# Patient Record
Sex: MALE | Race: WHITE | NOT HISPANIC OR LATINO | Employment: OTHER | URBAN - METROPOLITAN AREA
[De-identification: names, ages, dates, MRNs, and addresses within clinical notes are randomized per-mention and may not be internally consistent; named-entity substitution may affect disease eponyms.]

---

## 2021-01-01 ENCOUNTER — APPOINTMENT (OUTPATIENT)
Dept: GENERAL RADIOLOGY | Facility: CLINIC | Age: 76
DRG: 871 | End: 2021-01-01
Attending: INTERNAL MEDICINE
Payer: COMMERCIAL

## 2021-01-01 ENCOUNTER — HOSPITAL ENCOUNTER (EMERGENCY)
Facility: CLINIC | Age: 76
Discharge: HOME | End: 2021-04-07
Attending: EMERGENCY MEDICINE | Admitting: EMERGENCY MEDICINE
Payer: COMMERCIAL

## 2021-01-01 ENCOUNTER — HOSPITAL ENCOUNTER (EMERGENCY)
Facility: CLINIC | Age: 76
Discharge: HOME | End: 2021-05-01
Attending: PHYSICIAN ASSISTANT | Admitting: PHYSICIAN ASSISTANT
Payer: COMMERCIAL

## 2021-01-01 ENCOUNTER — APPOINTMENT (OUTPATIENT)
Dept: ULTRASOUND IMAGING | Facility: CLINIC | Age: 76
DRG: 871 | End: 2021-01-01
Attending: INTERNAL MEDICINE
Payer: COMMERCIAL

## 2021-01-01 ENCOUNTER — APPOINTMENT (OUTPATIENT)
Dept: CT IMAGING | Facility: CLINIC | Age: 76
End: 2021-01-01
Attending: PHYSICIAN ASSISTANT
Payer: COMMERCIAL

## 2021-01-01 ENCOUNTER — HOSPITAL ENCOUNTER (OUTPATIENT)
Dept: MRI IMAGING | Facility: CLINIC | Age: 76
End: 2021-05-24
Attending: INTERNAL MEDICINE
Payer: COMMERCIAL

## 2021-01-01 ENCOUNTER — HOSPITAL ENCOUNTER (INPATIENT)
Facility: CLINIC | Age: 76
LOS: 6 days | Discharge: HOSPICE | DRG: 871 | End: 2021-06-05
Attending: EMERGENCY MEDICINE | Admitting: INTERNAL MEDICINE
Payer: COMMERCIAL

## 2021-01-01 ENCOUNTER — HOSPITAL ENCOUNTER (OUTPATIENT)
Dept: CT IMAGING | Facility: CLINIC | Age: 76
End: 2021-05-24
Attending: INTERNAL MEDICINE
Payer: COMMERCIAL

## 2021-01-01 ENCOUNTER — APPOINTMENT (OUTPATIENT)
Dept: GENERAL RADIOLOGY | Facility: CLINIC | Age: 76
DRG: 844 | End: 2021-01-01
Attending: EMERGENCY MEDICINE
Payer: COMMERCIAL

## 2021-01-01 ENCOUNTER — APPOINTMENT (OUTPATIENT)
Dept: CT IMAGING | Facility: CLINIC | Age: 76
DRG: 871 | End: 2021-01-01
Attending: EMERGENCY MEDICINE
Payer: COMMERCIAL

## 2021-01-01 ENCOUNTER — HOSPITAL ENCOUNTER (INPATIENT)
Facility: CLINIC | Age: 76
LOS: 1 days | DRG: 844 | End: 2021-06-10
Attending: EMERGENCY MEDICINE | Admitting: INTERNAL MEDICINE
Payer: COMMERCIAL

## 2021-01-01 ENCOUNTER — DOCUMENTATION ONLY (OUTPATIENT)
Dept: OTHER | Facility: CLINIC | Age: 76
End: 2021-01-01

## 2021-01-01 ENCOUNTER — HOSPITAL ENCOUNTER (OUTPATIENT)
Dept: ULTRASOUND IMAGING | Facility: CLINIC | Age: 76
Discharge: HOME | End: 2021-05-13
Attending: INTERNAL MEDICINE | Admitting: INTERNAL MEDICINE
Payer: COMMERCIAL

## 2021-01-01 VITALS
DIASTOLIC BLOOD PRESSURE: 90 MMHG | HEART RATE: 105 BPM | SYSTOLIC BLOOD PRESSURE: 148 MMHG | RESPIRATION RATE: 16 BRPM | OXYGEN SATURATION: 98 %

## 2021-01-01 VITALS
RESPIRATION RATE: 32 BRPM | WEIGHT: 137.2 LBS | BODY MASS INDEX: 20.32 KG/M2 | HEIGHT: 69 IN | HEART RATE: 93 BPM | SYSTOLIC BLOOD PRESSURE: 141 MMHG | OXYGEN SATURATION: 95 % | TEMPERATURE: 98.3 F | DIASTOLIC BLOOD PRESSURE: 97 MMHG

## 2021-01-01 VITALS
RESPIRATION RATE: 18 BRPM | SYSTOLIC BLOOD PRESSURE: 131 MMHG | TEMPERATURE: 98.1 F | HEART RATE: 82 BPM | OXYGEN SATURATION: 98 % | DIASTOLIC BLOOD PRESSURE: 81 MMHG

## 2021-01-01 VITALS
OXYGEN SATURATION: 96 % | RESPIRATION RATE: 18 BRPM | DIASTOLIC BLOOD PRESSURE: 79 MMHG | HEART RATE: 89 BPM | TEMPERATURE: 98 F | SYSTOLIC BLOOD PRESSURE: 125 MMHG

## 2021-01-01 VITALS
RESPIRATION RATE: 28 BRPM | SYSTOLIC BLOOD PRESSURE: 133 MMHG | DIASTOLIC BLOOD PRESSURE: 64 MMHG | OXYGEN SATURATION: 86 % | HEART RATE: 67 BPM | TEMPERATURE: 98.4 F

## 2021-01-01 DIAGNOSIS — C7A.8 OTHER MALIGNANT NEUROENDOCRINE TUMORS (H): ICD-10-CM

## 2021-01-01 DIAGNOSIS — R41.82 ALTERED MENTAL STATUS, UNSPECIFIED ALTERED MENTAL STATUS TYPE: ICD-10-CM

## 2021-01-01 DIAGNOSIS — K76.9 LIVER LESION: ICD-10-CM

## 2021-01-01 DIAGNOSIS — Z51.5 HOSPICE CARE PATIENT: ICD-10-CM

## 2021-01-01 DIAGNOSIS — G89.3 NEOPLASM RELATED PAIN: ICD-10-CM

## 2021-01-01 DIAGNOSIS — K86.9 DISEASE OF PANCREAS: ICD-10-CM

## 2021-01-01 DIAGNOSIS — C7A.8 NEUROENDOCRINE CANCER (H): Primary | ICD-10-CM

## 2021-01-01 DIAGNOSIS — M54.2 CHRONIC NECK PAIN: ICD-10-CM

## 2021-01-01 DIAGNOSIS — Z11.59 ENCOUNTER FOR SCREENING FOR OTHER VIRAL DISEASES: ICD-10-CM

## 2021-01-01 DIAGNOSIS — J96.01 ACUTE RESPIRATORY FAILURE WITH HYPOXIA (H): ICD-10-CM

## 2021-01-01 DIAGNOSIS — G89.29 CHRONIC NECK PAIN: ICD-10-CM

## 2021-01-01 DIAGNOSIS — K86.89 PANCREATIC MASS: ICD-10-CM

## 2021-01-01 DIAGNOSIS — R93.2 ABNORMAL CT OF LIVER: ICD-10-CM

## 2021-01-01 DIAGNOSIS — R16.0 LIVER MASS: ICD-10-CM

## 2021-01-01 DIAGNOSIS — J18.9 PNEUMONIA DUE TO INFECTIOUS ORGANISM, UNSPECIFIED LATERALITY, UNSPECIFIED PART OF LUNG: ICD-10-CM

## 2021-01-01 LAB
ALBUMIN SERPL-MCNC: 2 G/DL (ref 3.4–5)
ALBUMIN SERPL-MCNC: 2.3 G/DL (ref 3.4–5)
ALBUMIN SERPL-MCNC: 2.4 G/DL (ref 3.4–5)
ALBUMIN SERPL-MCNC: 2.5 G/DL (ref 3.4–5)
ALBUMIN SERPL-MCNC: 3 G/DL (ref 3.4–5)
ALBUMIN UR-MCNC: NEGATIVE MG/DL
ALP SERPL-CCNC: 179 U/L (ref 40–150)
ALP SERPL-CCNC: 357 U/L (ref 40–150)
ALP SERPL-CCNC: 391 U/L (ref 40–150)
ALP SERPL-CCNC: 419 U/L (ref 40–150)
ALP SERPL-CCNC: 423 U/L (ref 40–150)
ALT SERPL W P-5'-P-CCNC: 110 U/L (ref 0–70)
ALT SERPL W P-5'-P-CCNC: 114 U/L (ref 0–70)
ALT SERPL W P-5'-P-CCNC: 58 U/L (ref 0–70)
ALT SERPL W P-5'-P-CCNC: 70 U/L (ref 0–70)
ALT SERPL W P-5'-P-CCNC: 80 U/L (ref 0–70)
ANION GAP SERPL CALCULATED.3IONS-SCNC: 1 MMOL/L (ref 3–14)
ANION GAP SERPL CALCULATED.3IONS-SCNC: 2 MMOL/L (ref 3–14)
ANION GAP SERPL CALCULATED.3IONS-SCNC: 2 MMOL/L (ref 3–14)
ANION GAP SERPL CALCULATED.3IONS-SCNC: 3 MMOL/L (ref 3–14)
ANION GAP SERPL CALCULATED.3IONS-SCNC: 4 MMOL/L (ref 3–14)
ANION GAP SERPL CALCULATED.3IONS-SCNC: 5 MMOL/L (ref 3–14)
ANION GAP SERPL CALCULATED.3IONS-SCNC: 6 MMOL/L (ref 3–14)
ANION GAP SERPL CALCULATED.3IONS-SCNC: ABNORMAL MMOL/L (ref 3–14)
APPEARANCE UR: CLEAR
AST SERPL W P-5'-P-CCNC: 114 U/L (ref 0–45)
AST SERPL W P-5'-P-CCNC: 129 U/L (ref 0–45)
AST SERPL W P-5'-P-CCNC: 385 U/L (ref 0–45)
AST SERPL W P-5'-P-CCNC: 499 U/L (ref 0–45)
AST SERPL W P-5'-P-CCNC: 55 U/L (ref 0–45)
BACTERIA SPEC CULT: NO GROWTH
BACTERIA SPEC CULT: NO GROWTH
BASOPHILS # BLD AUTO: 0 10E9/L (ref 0–0.2)
BASOPHILS # BLD AUTO: 0 10E9/L (ref 0–0.2)
BASOPHILS # BLD AUTO: 0.1 10E9/L (ref 0–0.2)
BASOPHILS # BLD AUTO: 0.1 10E9/L (ref 0–0.2)
BASOPHILS NFR BLD AUTO: 0 %
BASOPHILS NFR BLD AUTO: 0.2 %
BASOPHILS NFR BLD AUTO: 0.5 %
BASOPHILS NFR BLD AUTO: 0.6 %
BILIRUB SERPL-MCNC: 0.4 MG/DL (ref 0.2–1.3)
BILIRUB SERPL-MCNC: 0.6 MG/DL (ref 0.2–1.3)
BILIRUB SERPL-MCNC: 0.7 MG/DL (ref 0.2–1.3)
BILIRUB SERPL-MCNC: 0.9 MG/DL (ref 0.2–1.3)
BILIRUB SERPL-MCNC: 1.3 MG/DL (ref 0.2–1.3)
BILIRUB UR QL STRIP: NEGATIVE
BUN SERPL-MCNC: 11 MG/DL (ref 7–30)
BUN SERPL-MCNC: 11 MG/DL (ref 7–30)
BUN SERPL-MCNC: 13 MG/DL (ref 7–30)
BUN SERPL-MCNC: 15 MG/DL (ref 7–30)
BUN SERPL-MCNC: 16 MG/DL (ref 7–30)
BUN SERPL-MCNC: 6 MG/DL (ref 7–30)
BUN SERPL-MCNC: 9 MG/DL (ref 7–30)
BUN SERPL-MCNC: 9 MG/DL (ref 7–30)
CALCIUM SERPL-MCNC: 8.2 MG/DL (ref 8.5–10.1)
CALCIUM SERPL-MCNC: 8.4 MG/DL (ref 8.5–10.1)
CALCIUM SERPL-MCNC: 8.5 MG/DL (ref 8.5–10.1)
CALCIUM SERPL-MCNC: 8.6 MG/DL (ref 8.5–10.1)
CALCIUM SERPL-MCNC: 8.6 MG/DL (ref 8.5–10.1)
CHLORIDE SERPL-SCNC: 106 MMOL/L (ref 94–109)
CHLORIDE SERPL-SCNC: 89 MMOL/L (ref 94–109)
CHLORIDE SERPL-SCNC: 91 MMOL/L (ref 94–109)
CHLORIDE SERPL-SCNC: 92 MMOL/L (ref 94–109)
CHLORIDE SERPL-SCNC: 92 MMOL/L (ref 94–109)
CHLORIDE SERPL-SCNC: 95 MMOL/L (ref 94–109)
CHLORIDE SERPL-SCNC: 98 MMOL/L (ref 94–109)
CHLORIDE SERPL-SCNC: 99 MMOL/L (ref 94–109)
CO2 BLDCOV-SCNC: 57 MMOL/L (ref 21–28)
CO2 SERPL-SCNC: 31 MMOL/L (ref 20–32)
CO2 SERPL-SCNC: 31 MMOL/L (ref 20–32)
CO2 SERPL-SCNC: 32 MMOL/L (ref 20–32)
CO2 SERPL-SCNC: 32 MMOL/L (ref 20–32)
CO2 SERPL-SCNC: 33 MMOL/L (ref 20–32)
CO2 SERPL-SCNC: 34 MMOL/L (ref 20–32)
CO2 SERPL-SCNC: 35 MMOL/L (ref 20–32)
CO2 SERPL-SCNC: >45 MMOL/L (ref 20–32)
COLOR UR AUTO: YELLOW
COPATH REPORT: NORMAL
COPATH REPORT: NORMAL
CREAT SERPL-MCNC: 0.39 MG/DL (ref 0.66–1.25)
CREAT SERPL-MCNC: 0.44 MG/DL (ref 0.66–1.25)
CREAT SERPL-MCNC: 0.44 MG/DL (ref 0.66–1.25)
CREAT SERPL-MCNC: 0.45 MG/DL (ref 0.66–1.25)
CREAT SERPL-MCNC: 0.47 MG/DL (ref 0.66–1.25)
CREAT SERPL-MCNC: 0.49 MG/DL (ref 0.66–1.25)
CREAT SERPL-MCNC: 0.53 MG/DL (ref 0.66–1.25)
CREAT SERPL-MCNC: 0.7 MG/DL (ref 0.66–1.25)
CREAT SERPL-MCNC: 0.8 MG/DL (ref 0.66–1.25)
DIFFERENTIAL METHOD BLD: ABNORMAL
EOSINOPHIL # BLD AUTO: 0 10E9/L (ref 0–0.7)
EOSINOPHIL # BLD AUTO: 0.4 10E9/L (ref 0–0.7)
EOSINOPHIL NFR BLD AUTO: 0 %
EOSINOPHIL NFR BLD AUTO: 0.1 %
EOSINOPHIL NFR BLD AUTO: 0.2 %
EOSINOPHIL NFR BLD AUTO: 3.3 %
ERYTHROCYTE [DISTWIDTH] IN BLOOD BY AUTOMATED COUNT: 14.5 % (ref 10–15)
ERYTHROCYTE [DISTWIDTH] IN BLOOD BY AUTOMATED COUNT: 14.6 % (ref 10–15)
ERYTHROCYTE [DISTWIDTH] IN BLOOD BY AUTOMATED COUNT: 14.6 % (ref 10–15)
ERYTHROCYTE [DISTWIDTH] IN BLOOD BY AUTOMATED COUNT: 14.7 % (ref 10–15)
ERYTHROCYTE [DISTWIDTH] IN BLOOD BY AUTOMATED COUNT: 14.8 % (ref 10–15)
ERYTHROCYTE [DISTWIDTH] IN BLOOD BY AUTOMATED COUNT: 15 % (ref 10–15)
GFR SERPL CREATININE-BSD FRML MDRD: 86 ML/MIN/{1.73_M2}
GFR SERPL CREATININE-BSD FRML MDRD: >90 ML/MIN/{1.73_M2}
GLUCOSE SERPL-MCNC: 100 MG/DL (ref 70–99)
GLUCOSE SERPL-MCNC: 100 MG/DL (ref 70–99)
GLUCOSE SERPL-MCNC: 107 MG/DL (ref 70–99)
GLUCOSE SERPL-MCNC: 115 MG/DL (ref 70–99)
GLUCOSE SERPL-MCNC: 121 MG/DL (ref 70–99)
GLUCOSE SERPL-MCNC: 123 MG/DL (ref 70–99)
GLUCOSE SERPL-MCNC: 133 MG/DL (ref 70–99)
GLUCOSE SERPL-MCNC: 142 MG/DL (ref 70–99)
GLUCOSE UR STRIP-MCNC: NEGATIVE MG/DL
HCT VFR BLD AUTO: 35.8 % (ref 40–53)
HCT VFR BLD AUTO: 36.4 % (ref 40–53)
HCT VFR BLD AUTO: 36.4 % (ref 40–53)
HCT VFR BLD AUTO: 37.9 % (ref 40–53)
HCT VFR BLD AUTO: 38.2 % (ref 40–53)
HCT VFR BLD AUTO: 40.9 % (ref 40–53)
HCT VFR BLD AUTO: 41.3 % (ref 40–53)
HCT VFR BLD AUTO: 43.5 % (ref 40–53)
HGB BLD-MCNC: 10.8 G/DL (ref 13.3–17.7)
HGB BLD-MCNC: 11.3 G/DL (ref 13.3–17.7)
HGB BLD-MCNC: 11.5 G/DL (ref 13.3–17.7)
HGB BLD-MCNC: 11.8 G/DL (ref 13.3–17.7)
HGB BLD-MCNC: 12 G/DL (ref 13.3–17.7)
HGB BLD-MCNC: 12.3 G/DL (ref 13.3–17.7)
HGB BLD-MCNC: 12.8 G/DL (ref 13.3–17.7)
HGB BLD-MCNC: 13.6 G/DL (ref 13.3–17.7)
HGB UR QL STRIP: NEGATIVE
IMM GRANULOCYTES # BLD: 0 10E9/L (ref 0–0.4)
IMM GRANULOCYTES # BLD: 0.1 10E9/L (ref 0–0.4)
IMM GRANULOCYTES # BLD: 0.2 10E9/L (ref 0–0.4)
IMM GRANULOCYTES NFR BLD: 0.3 %
IMM GRANULOCYTES NFR BLD: 0.5 %
IMM GRANULOCYTES NFR BLD: 1.2 %
INR PPP: 1.13 (ref 0.86–1.14)
INR PPP: 1.34 (ref 0.86–1.14)
INTERPRETATION ECG - MUSE: NORMAL
KETONES UR STRIP-MCNC: ABNORMAL MG/DL
LABORATORY COMMENT REPORT: NORMAL
LACTATE BLD-SCNC: 0.8 MMOL/L (ref 0.7–2)
LACTATE BLD-SCNC: 1.7 MMOL/L (ref 0.7–2)
LACTATE BLD-SCNC: 1.8 MMOL/L (ref 0.7–2.1)
LACTATE BLD-SCNC: 2.1 MMOL/L (ref 0.7–2)
LEUKOCYTE ESTERASE UR QL STRIP: NEGATIVE
LIPASE SERPL-CCNC: 255 U/L (ref 73–393)
LYMPHOCYTES # BLD AUTO: 1.1 10E9/L (ref 0.8–5.3)
LYMPHOCYTES # BLD AUTO: 1.2 10E9/L (ref 0.8–5.3)
LYMPHOCYTES # BLD AUTO: 1.3 10E9/L (ref 0.8–5.3)
LYMPHOCYTES # BLD AUTO: 2.2 10E9/L (ref 0.8–5.3)
LYMPHOCYTES NFR BLD AUTO: 17.5 %
LYMPHOCYTES NFR BLD AUTO: 5 %
LYMPHOCYTES NFR BLD AUTO: 6.3 %
LYMPHOCYTES NFR BLD AUTO: 7.3 %
MCH RBC QN AUTO: 28.5 PG (ref 26.5–33)
MCH RBC QN AUTO: 28.5 PG (ref 26.5–33)
MCH RBC QN AUTO: 28.6 PG (ref 26.5–33)
MCH RBC QN AUTO: 28.6 PG (ref 26.5–33)
MCH RBC QN AUTO: 28.9 PG (ref 26.5–33)
MCH RBC QN AUTO: 29 PG (ref 26.5–33)
MCH RBC QN AUTO: 29.2 PG (ref 26.5–33)
MCH RBC QN AUTO: 29.4 PG (ref 26.5–33)
MCHC RBC AUTO-ENTMCNC: 30.1 G/DL (ref 31.5–36.5)
MCHC RBC AUTO-ENTMCNC: 30.1 G/DL (ref 31.5–36.5)
MCHC RBC AUTO-ENTMCNC: 30.2 G/DL (ref 31.5–36.5)
MCHC RBC AUTO-ENTMCNC: 31 G/DL (ref 31.5–36.5)
MCHC RBC AUTO-ENTMCNC: 31 G/DL (ref 31.5–36.5)
MCHC RBC AUTO-ENTMCNC: 31.3 G/DL (ref 31.5–36.5)
MCHC RBC AUTO-ENTMCNC: 31.7 G/DL (ref 31.5–36.5)
MCHC RBC AUTO-ENTMCNC: 32.4 G/DL (ref 31.5–36.5)
MCV RBC AUTO: 90 FL (ref 78–100)
MCV RBC AUTO: 90 FL (ref 78–100)
MCV RBC AUTO: 92 FL (ref 78–100)
MCV RBC AUTO: 95 FL (ref 78–100)
MCV RBC AUTO: 96 FL (ref 78–100)
MCV RBC AUTO: 98 FL (ref 78–100)
MONOCYTES # BLD AUTO: 1.5 10E9/L (ref 0–1.3)
MONOCYTES # BLD AUTO: 1.6 10E9/L (ref 0–1.3)
MONOCYTES NFR BLD AUTO: 12 %
MONOCYTES NFR BLD AUTO: 6 %
MONOCYTES NFR BLD AUTO: 8.9 %
MONOCYTES NFR BLD AUTO: 9.4 %
NEUTROPHILS # BLD AUTO: 13.9 10E9/L (ref 1.6–8.3)
NEUTROPHILS # BLD AUTO: 14.4 10E9/L (ref 1.6–8.3)
NEUTROPHILS # BLD AUTO: 22.5 10E9/L (ref 1.6–8.3)
NEUTROPHILS # BLD AUTO: 8.3 10E9/L (ref 1.6–8.3)
NEUTROPHILS NFR BLD AUTO: 66.3 %
NEUTROPHILS NFR BLD AUTO: 82.1 %
NEUTROPHILS NFR BLD AUTO: 83.3 %
NEUTROPHILS NFR BLD AUTO: 89 %
NITRATE UR QL: NEGATIVE
NRBC # BLD AUTO: 0 10*3/UL
NRBC BLD AUTO-RTO: 0 /100
NT-PROBNP SERPL-MCNC: 159 PG/ML (ref 0–1800)
NT-PROBNP SERPL-MCNC: 644 PG/ML (ref 0–1800)
PCO2 BLDV: 85 MM HG (ref 40–50)
PH BLDV: 7.43 PH (ref 7.32–7.43)
PH UR STRIP: 6 PH (ref 5–7)
PLATELET # BLD AUTO: 250 10E9/L (ref 150–450)
PLATELET # BLD AUTO: 282 10E9/L (ref 150–450)
PLATELET # BLD AUTO: 296 10E9/L (ref 150–450)
PLATELET # BLD AUTO: 304 10E9/L (ref 150–450)
PLATELET # BLD AUTO: 336 10E9/L (ref 150–450)
PLATELET # BLD AUTO: 352 10E9/L (ref 150–450)
PLATELET # BLD AUTO: 360 10E9/L (ref 150–450)
PLATELET # BLD AUTO: 381 10E9/L (ref 150–450)
PLATELET # BLD AUTO: 411 10E9/L (ref 150–450)
PLATELET # BLD EST: ABNORMAL 10*3/UL
PO2 BLDV: 95 MM HG (ref 25–47)
POTASSIUM SERPL-SCNC: 3.6 MMOL/L (ref 3.4–5.3)
POTASSIUM SERPL-SCNC: 3.7 MMOL/L (ref 3.4–5.3)
POTASSIUM SERPL-SCNC: 4.1 MMOL/L (ref 3.4–5.3)
POTASSIUM SERPL-SCNC: 4.2 MMOL/L (ref 3.4–5.3)
POTASSIUM SERPL-SCNC: 4.2 MMOL/L (ref 3.4–5.3)
POTASSIUM SERPL-SCNC: 4.3 MMOL/L (ref 3.4–5.3)
POTASSIUM SERPL-SCNC: 4.4 MMOL/L (ref 3.4–5.3)
POTASSIUM SERPL-SCNC: 4.4 MMOL/L (ref 3.4–5.3)
PROT SERPL-MCNC: 6.9 G/DL (ref 6.8–8.8)
PROT SERPL-MCNC: 7.2 G/DL (ref 6.8–8.8)
PROT SERPL-MCNC: 7.3 G/DL (ref 6.8–8.8)
PROT SERPL-MCNC: 7.4 G/DL (ref 6.8–8.8)
PROT SERPL-MCNC: 7.6 G/DL (ref 6.8–8.8)
RBC # BLD AUTO: 3.78 10E12/L (ref 4.4–5.9)
RBC # BLD AUTO: 3.96 10E12/L (ref 4.4–5.9)
RBC # BLD AUTO: 3.97 10E12/L (ref 4.4–5.9)
RBC # BLD AUTO: 4.04 10E12/L (ref 4.4–5.9)
RBC # BLD AUTO: 4.18 10E12/L (ref 4.4–5.9)
RBC # BLD AUTO: 4.21 10E12/L (ref 4.4–5.9)
RBC # BLD AUTO: 4.47 10E12/L (ref 4.4–5.9)
RBC # BLD AUTO: 4.71 10E12/L (ref 4.4–5.9)
RBC MORPH BLD: ABNORMAL
SAO2 % BLDV FROM PO2: 97 %
SARS-COV-2 RNA RESP QL NAA+PROBE: NEGATIVE
SARS-COV-2 RNA RESP QL NAA+PROBE: NORMAL
SODIUM SERPL-SCNC: 126 MMOL/L (ref 133–144)
SODIUM SERPL-SCNC: 128 MMOL/L (ref 133–144)
SODIUM SERPL-SCNC: 129 MMOL/L (ref 133–144)
SODIUM SERPL-SCNC: 131 MMOL/L (ref 133–144)
SODIUM SERPL-SCNC: 134 MMOL/L (ref 133–144)
SODIUM SERPL-SCNC: 134 MMOL/L (ref 133–144)
SODIUM SERPL-SCNC: 137 MMOL/L (ref 133–144)
SODIUM SERPL-SCNC: 139 MMOL/L (ref 133–144)
SOURCE: ABNORMAL
SP GR UR STRIP: 1.02 (ref 1–1.03)
SPECIMEN SOURCE: NORMAL
TROPONIN I SERPL-MCNC: <0.015 UG/L (ref 0–0.04)
UROBILINOGEN UR STRIP-MCNC: 2 MG/DL (ref 0–2)
VANCOMYCIN SERPL-MCNC: 10.6 MG/L
WBC # BLD AUTO: 12.5 10E9/L (ref 4–11)
WBC # BLD AUTO: 15.8 10E9/L (ref 4–11)
WBC # BLD AUTO: 16.7 10E9/L (ref 4–11)
WBC # BLD AUTO: 17 10E9/L (ref 4–11)
WBC # BLD AUTO: 17.3 10E9/L (ref 4–11)
WBC # BLD AUTO: 20.3 10E9/L (ref 4–11)
WBC # BLD AUTO: 21.3 10E9/L (ref 4–11)
WBC # BLD AUTO: 25.3 10E9/L (ref 4–11)

## 2021-01-01 PROCEDURE — 93005 ELECTROCARDIOGRAM TRACING: CPT

## 2021-01-01 PROCEDURE — 99285 EMERGENCY DEPT VISIT HI MDM: CPT | Mod: 25

## 2021-01-01 PROCEDURE — 250N000013 HC RX MED GY IP 250 OP 250 PS 637: Performed by: INTERNAL MEDICINE

## 2021-01-01 PROCEDURE — 71260 CT THORAX DX C+: CPT

## 2021-01-01 PROCEDURE — 250N000011 HC RX IP 250 OP 636: Performed by: INTERNAL MEDICINE

## 2021-01-01 PROCEDURE — 120N000001 HC R&B MED SURG/OB

## 2021-01-01 PROCEDURE — 36415 COLL VENOUS BLD VENIPUNCTURE: CPT | Performed by: EMERGENCY MEDICINE

## 2021-01-01 PROCEDURE — 36415 COLL VENOUS BLD VENIPUNCTURE: CPT | Performed by: INTERNAL MEDICINE

## 2021-01-01 PROCEDURE — 250N000009 HC RX 250: Performed by: INTERNAL MEDICINE

## 2021-01-01 PROCEDURE — 99231 SBSQ HOSP IP/OBS SF/LOW 25: CPT | Performed by: NURSE PRACTITIONER

## 2021-01-01 PROCEDURE — 99233 SBSQ HOSP IP/OBS HIGH 50: CPT | Performed by: NURSE PRACTITIONER

## 2021-01-01 PROCEDURE — 258N000003 HC RX IP 258 OP 636: Performed by: INTERNAL MEDICINE

## 2021-01-01 PROCEDURE — 81003 URINALYSIS AUTO W/O SCOPE: CPT | Performed by: PHYSICIAN ASSISTANT

## 2021-01-01 PROCEDURE — 80053 COMPREHEN METABOLIC PANEL: CPT | Performed by: INTERNAL MEDICINE

## 2021-01-01 PROCEDURE — 76942 ECHO GUIDE FOR BIOPSY: CPT

## 2021-01-01 PROCEDURE — C9803 HOPD COVID-19 SPEC COLLECT: HCPCS

## 2021-01-01 PROCEDURE — 96361 HYDRATE IV INFUSION ADD-ON: CPT

## 2021-01-01 PROCEDURE — 71045 X-RAY EXAM CHEST 1 VIEW: CPT

## 2021-01-01 PROCEDURE — 250N000009 HC RX 250: Performed by: RADIOLOGY

## 2021-01-01 PROCEDURE — 82565 ASSAY OF CREATININE: CPT | Performed by: INTERNAL MEDICINE

## 2021-01-01 PROCEDURE — 85025 COMPLETE CBC W/AUTO DIFF WBC: CPT | Performed by: EMERGENCY MEDICINE

## 2021-01-01 PROCEDURE — 80202 ASSAY OF VANCOMYCIN: CPT | Performed by: INTERNAL MEDICINE

## 2021-01-01 PROCEDURE — 80048 BASIC METABOLIC PNL TOTAL CA: CPT | Performed by: EMERGENCY MEDICINE

## 2021-01-01 PROCEDURE — 88185 FLOWCYTOMETRY/TC ADD-ON: CPT | Performed by: INTERNAL MEDICINE

## 2021-01-01 PROCEDURE — 83605 ASSAY OF LACTIC ACID: CPT

## 2021-01-01 PROCEDURE — 255N000002 HC RX 255 OP 636: Performed by: INTERNAL MEDICINE

## 2021-01-01 PROCEDURE — 99233 SBSQ HOSP IP/OBS HIGH 50: CPT | Performed by: INTERNAL MEDICINE

## 2021-01-01 PROCEDURE — 85027 COMPLETE CBC AUTOMATED: CPT | Performed by: INTERNAL MEDICINE

## 2021-01-01 PROCEDURE — 83605 ASSAY OF LACTIC ACID: CPT | Performed by: INTERNAL MEDICINE

## 2021-01-01 PROCEDURE — 250N000009 HC RX 250: Performed by: PHYSICIAN ASSISTANT

## 2021-01-01 PROCEDURE — 83605 ASSAY OF LACTIC ACID: CPT | Performed by: PHYSICIAN ASSISTANT

## 2021-01-01 PROCEDURE — 258N000003 HC RX IP 258 OP 636: Performed by: EMERGENCY MEDICINE

## 2021-01-01 PROCEDURE — 99232 SBSQ HOSP IP/OBS MODERATE 35: CPT | Performed by: INTERNAL MEDICINE

## 2021-01-01 PROCEDURE — 80053 COMPREHEN METABOLIC PANEL: CPT | Performed by: EMERGENCY MEDICINE

## 2021-01-01 PROCEDURE — 70553 MRI BRAIN STEM W/O & W/DYE: CPT

## 2021-01-01 PROCEDURE — 96365 THER/PROPH/DIAG IV INF INIT: CPT | Mod: 59

## 2021-01-01 PROCEDURE — 88342 IMHCHEM/IMCYTCHM 1ST ANTB: CPT | Mod: 26 | Performed by: PATHOLOGY

## 2021-01-01 PROCEDURE — 250N000013 HC RX MED GY IP 250 OP 250 PS 637: Performed by: EMERGENCY MEDICINE

## 2021-01-01 PROCEDURE — 87635 SARS-COV-2 COVID-19 AMP PRB: CPT | Performed by: EMERGENCY MEDICINE

## 2021-01-01 PROCEDURE — 88342 IMHCHEM/IMCYTCHM 1ST ANTB: CPT | Mod: TC | Performed by: INTERNAL MEDICINE

## 2021-01-01 PROCEDURE — 88341 IMHCHEM/IMCYTCHM EA ADD ANTB: CPT | Mod: 26 | Performed by: PATHOLOGY

## 2021-01-01 PROCEDURE — 83605 ASSAY OF LACTIC ACID: CPT | Performed by: EMERGENCY MEDICINE

## 2021-01-01 PROCEDURE — 99283 EMERGENCY DEPT VISIT LOW MDM: CPT

## 2021-01-01 PROCEDURE — 88341 IMHCHEM/IMCYTCHM EA ADD ANTB: CPT | Mod: TC | Performed by: INTERNAL MEDICINE

## 2021-01-01 PROCEDURE — 88307 TISSUE EXAM BY PATHOLOGIST: CPT | Mod: 26 | Performed by: PATHOLOGY

## 2021-01-01 PROCEDURE — 250N000011 HC RX IP 250 OP 636: Performed by: EMERGENCY MEDICINE

## 2021-01-01 PROCEDURE — 83880 ASSAY OF NATRIURETIC PEPTIDE: CPT | Performed by: INTERNAL MEDICINE

## 2021-01-01 PROCEDURE — 76705 ECHO EXAM OF ABDOMEN: CPT

## 2021-01-01 PROCEDURE — 96374 THER/PROPH/DIAG INJ IV PUSH: CPT

## 2021-01-01 PROCEDURE — U0003 INFECTIOUS AGENT DETECTION BY NUCLEIC ACID (DNA OR RNA); SEVERE ACUTE RESPIRATORY SYNDROME CORONAVIRUS 2 (SARS-COV-2) (CORONAVIRUS DISEASE [COVID-19]), AMPLIFIED PROBE TECHNIQUE, MAKING USE OF HIGH THROUGHPUT TECHNOLOGIES AS DESCRIBED BY CMS-2020-01-R: HCPCS | Performed by: INTERNAL MEDICINE

## 2021-01-01 PROCEDURE — 83690 ASSAY OF LIPASE: CPT | Performed by: EMERGENCY MEDICINE

## 2021-01-01 PROCEDURE — 99239 HOSP IP/OBS DSCHRG MGMT >30: CPT | Performed by: INTERNAL MEDICINE

## 2021-01-01 PROCEDURE — 88188 FLOWCYTOMETRY/READ 9-15: CPT | Performed by: STUDENT IN AN ORGANIZED HEALTH CARE EDUCATION/TRAINING PROGRAM

## 2021-01-01 PROCEDURE — 99222 1ST HOSP IP/OBS MODERATE 55: CPT | Performed by: INTERNAL MEDICINE

## 2021-01-01 PROCEDURE — 99223 1ST HOSP IP/OBS HIGH 75: CPT | Performed by: NURSE PRACTITIONER

## 2021-01-01 PROCEDURE — 84484 ASSAY OF TROPONIN QUANT: CPT | Performed by: EMERGENCY MEDICINE

## 2021-01-01 PROCEDURE — 85610 PROTHROMBIN TIME: CPT | Performed by: RADIOLOGY

## 2021-01-01 PROCEDURE — 71046 X-RAY EXAM CHEST 2 VIEWS: CPT

## 2021-01-01 PROCEDURE — 99223 1ST HOSP IP/OBS HIGH 75: CPT | Mod: AI | Performed by: INTERNAL MEDICINE

## 2021-01-01 PROCEDURE — 85049 AUTOMATED PLATELET COUNT: CPT | Performed by: INTERNAL MEDICINE

## 2021-01-01 PROCEDURE — 88184 FLOWCYTOMETRY/ TC 1 MARKER: CPT | Performed by: INTERNAL MEDICINE

## 2021-01-01 PROCEDURE — 250N000011 HC RX IP 250 OP 636: Performed by: PHYSICIAN ASSISTANT

## 2021-01-01 PROCEDURE — 80048 BASIC METABOLIC PNL TOTAL CA: CPT | Performed by: INTERNAL MEDICINE

## 2021-01-01 PROCEDURE — 71275 CT ANGIOGRAPHY CHEST: CPT

## 2021-01-01 PROCEDURE — 74177 CT ABD & PELVIS W/CONTRAST: CPT | Mod: 59

## 2021-01-01 PROCEDURE — 999N001021 HC STATISTIC H-SPECIAL HANDLING: Performed by: INTERNAL MEDICINE

## 2021-01-01 PROCEDURE — 85025 COMPLETE CBC W/AUTO DIFF WBC: CPT | Performed by: INTERNAL MEDICINE

## 2021-01-01 PROCEDURE — 83880 ASSAY OF NATRIURETIC PEPTIDE: CPT | Performed by: EMERGENCY MEDICINE

## 2021-01-01 PROCEDURE — 999N001136 HC STATISTIC FLOW INT 9-15 ABY TC 88188: Performed by: INTERNAL MEDICINE

## 2021-01-01 PROCEDURE — U0005 INFEC AGEN DETEC AMPLI PROBE: HCPCS | Performed by: INTERNAL MEDICINE

## 2021-01-01 PROCEDURE — A9585 GADOBUTROL INJECTION: HCPCS | Performed by: INTERNAL MEDICINE

## 2021-01-01 PROCEDURE — 85610 PROTHROMBIN TIME: CPT | Performed by: INTERNAL MEDICINE

## 2021-01-01 PROCEDURE — 82803 BLOOD GASES ANY COMBINATION: CPT

## 2021-01-01 PROCEDURE — 250N000011 HC RX IP 250 OP 636

## 2021-01-01 PROCEDURE — 88307 TISSUE EXAM BY PATHOLOGIST: CPT | Mod: TC | Performed by: INTERNAL MEDICINE

## 2021-01-01 PROCEDURE — 87040 BLOOD CULTURE FOR BACTERIA: CPT | Performed by: EMERGENCY MEDICINE

## 2021-01-01 RX ORDER — CLONAZEPAM 0.5 MG/1
0.5 TABLET ORAL
Qty: 12 TABLET | Refills: 0 | Status: SHIPPED | OUTPATIENT
Start: 2021-01-01

## 2021-01-01 RX ORDER — ACETAMINOPHEN 325 MG/1
650 TABLET ORAL EVERY 4 HOURS PRN
Status: DISCONTINUED | OUTPATIENT
Start: 2021-01-01 | End: 2021-01-01 | Stop reason: HOSPADM

## 2021-01-01 RX ORDER — METHYLPREDNISOLONE 4 MG
4 TABLET, DOSE PACK ORAL SEE ADMIN INSTRUCTIONS
Qty: 21 TABLET | Refills: 0 | Status: SHIPPED | OUTPATIENT
Start: 2021-01-01 | End: 2021-01-01

## 2021-01-01 RX ORDER — DORZOLAMIDE HYDROCHLORIDE AND TIMOLOL MALEATE 20; 5 MG/ML; MG/ML
1 SOLUTION/ DROPS OPHTHALMIC 2 TIMES DAILY
COMMUNITY

## 2021-01-01 RX ORDER — AZITHROMYCIN 500 MG/5ML
500 INJECTION, POWDER, LYOPHILIZED, FOR SOLUTION INTRAVENOUS ONCE
Status: COMPLETED | OUTPATIENT
Start: 2021-01-01 | End: 2021-01-01

## 2021-01-01 RX ORDER — IBUPROFEN 600 MG/1
600 TABLET, FILM COATED ORAL ONCE
Status: COMPLETED | OUTPATIENT
Start: 2021-01-01 | End: 2021-01-01

## 2021-01-01 RX ORDER — ONDANSETRON 2 MG/ML
4 INJECTION INTRAMUSCULAR; INTRAVENOUS EVERY 6 HOURS PRN
Status: DISCONTINUED | OUTPATIENT
Start: 2021-01-01 | End: 2021-01-01 | Stop reason: HOSPADM

## 2021-01-01 RX ORDER — LORAZEPAM 0.5 MG/1
.25-.5 TABLET ORAL EVERY 4 HOURS PRN
Status: DISCONTINUED | OUTPATIENT
Start: 2021-01-01 | End: 2021-01-01

## 2021-01-01 RX ORDER — AMOXICILLIN 250 MG
1 CAPSULE ORAL 2 TIMES DAILY PRN
Status: DISCONTINUED | OUTPATIENT
Start: 2021-01-01 | End: 2021-01-01 | Stop reason: HOSPADM

## 2021-01-01 RX ORDER — BRIMONIDINE TARTRATE 2 MG/ML
1 SOLUTION/ DROPS OPHTHALMIC 2 TIMES DAILY
COMMUNITY

## 2021-01-01 RX ORDER — SODIUM CHLORIDE, SODIUM LACTATE, POTASSIUM CHLORIDE, CALCIUM CHLORIDE 600; 310; 30; 20 MG/100ML; MG/100ML; MG/100ML; MG/100ML
INJECTION, SOLUTION INTRAVENOUS CONTINUOUS
Status: DISCONTINUED | OUTPATIENT
Start: 2021-01-01 | End: 2021-01-01

## 2021-01-01 RX ORDER — AZITHROMYCIN 250 MG/1
250 TABLET, FILM COATED ORAL DAILY
Qty: 2 TABLET | Refills: 0 | Status: SHIPPED | OUTPATIENT
Start: 2021-01-01 | End: 2021-01-01

## 2021-01-01 RX ORDER — MORPHINE SULFATE 10 MG/5ML
2.5 SOLUTION ORAL
Qty: 40 ML | Refills: 0 | Status: SHIPPED | OUTPATIENT
Start: 2021-01-01 | End: 2021-01-01

## 2021-01-01 RX ORDER — ALBUTEROL SULFATE 0.83 MG/ML
2.5 SOLUTION RESPIRATORY (INHALATION) EVERY 6 HOURS PRN
Status: DISCONTINUED | OUTPATIENT
Start: 2021-01-01 | End: 2021-01-01 | Stop reason: HOSPADM

## 2021-01-01 RX ORDER — SODIUM CHLORIDE 9 MG/ML
INJECTION, SOLUTION INTRAVENOUS CONTINUOUS
Status: DISCONTINUED | OUTPATIENT
Start: 2021-01-01 | End: 2021-01-01 | Stop reason: HOSPADM

## 2021-01-01 RX ORDER — GADOBUTROL 604.72 MG/ML
7.5 INJECTION INTRAVENOUS ONCE
Status: COMPLETED | OUTPATIENT
Start: 2021-01-01 | End: 2021-01-01

## 2021-01-01 RX ORDER — ACETAMINOPHEN 325 MG/1
650 TABLET ORAL EVERY 4 HOURS PRN
Status: CANCELLED | OUTPATIENT
Start: 2021-01-01

## 2021-01-01 RX ORDER — CLONAZEPAM 0.5 MG/1
0.5 TABLET ORAL
Status: DISCONTINUED | OUTPATIENT
Start: 2021-01-01 | End: 2021-01-01 | Stop reason: HOSPADM

## 2021-01-01 RX ORDER — MONTELUKAST SODIUM 10 MG/1
10 TABLET ORAL AT BEDTIME
COMMUNITY

## 2021-01-01 RX ORDER — DIAZEPAM 2 MG
2 TABLET ORAL ONCE
Status: COMPLETED | OUTPATIENT
Start: 2021-01-01 | End: 2021-01-01

## 2021-01-01 RX ORDER — AZITHROMYCIN 250 MG/1
250 TABLET, FILM COATED ORAL DAILY
Status: COMPLETED | OUTPATIENT
Start: 2021-01-01 | End: 2021-01-01

## 2021-01-01 RX ORDER — ACETAMINOPHEN 650 MG/1
650 SUPPOSITORY RECTAL EVERY 4 HOURS PRN
Status: DISCONTINUED | OUTPATIENT
Start: 2021-01-01 | End: 2021-01-01 | Stop reason: HOSPADM

## 2021-01-01 RX ORDER — AZELASTINE 1 MG/ML
1 SPRAY, METERED NASAL DAILY
COMMUNITY

## 2021-01-01 RX ORDER — ONDANSETRON 4 MG/1
4 TABLET, ORALLY DISINTEGRATING ORAL EVERY 6 HOURS PRN
Status: DISCONTINUED | OUTPATIENT
Start: 2021-01-01 | End: 2021-01-01 | Stop reason: HOSPADM

## 2021-01-01 RX ORDER — LORAZEPAM 2 MG/ML
.5-1 INJECTION INTRAMUSCULAR EVERY 4 HOURS PRN
Status: DISCONTINUED | OUTPATIENT
Start: 2021-01-01 | End: 2021-01-01 | Stop reason: HOSPADM

## 2021-01-01 RX ORDER — IOPAMIDOL 755 MG/ML
500 INJECTION, SOLUTION INTRAVASCULAR ONCE
Status: COMPLETED | OUTPATIENT
Start: 2021-01-01 | End: 2021-01-01

## 2021-01-01 RX ORDER — FENTANYL CITRATE 50 UG/ML
100 INJECTION, SOLUTION INTRAMUSCULAR; INTRAVENOUS ONCE
Status: COMPLETED | OUTPATIENT
Start: 2021-01-01 | End: 2021-01-01

## 2021-01-01 RX ORDER — KETOROLAC TROMETHAMINE 5 MG/ML
1 SOLUTION OPHTHALMIC 2 TIMES DAILY
COMMUNITY

## 2021-01-01 RX ORDER — FENTANYL CITRATE 50 UG/ML
INJECTION, SOLUTION INTRAMUSCULAR; INTRAVENOUS
Status: COMPLETED
Start: 2021-01-01 | End: 2021-01-01

## 2021-01-01 RX ORDER — IOPAMIDOL 755 MG/ML
55 INJECTION, SOLUTION INTRAVASCULAR ONCE
Status: COMPLETED | OUTPATIENT
Start: 2021-01-01 | End: 2021-01-01

## 2021-01-01 RX ORDER — ACETAMINOPHEN 650 MG/1
650 SUPPOSITORY RECTAL EVERY 4 HOURS PRN
Qty: 4 SUPPOSITORY | Refills: 0 | Status: SHIPPED | OUTPATIENT
Start: 2021-01-01

## 2021-01-01 RX ORDER — CLONAZEPAM 0.5 MG/1
0.5 TABLET ORAL 2 TIMES DAILY PRN
Status: DISCONTINUED | OUTPATIENT
Start: 2021-01-01 | End: 2021-01-01 | Stop reason: HOSPADM

## 2021-01-01 RX ORDER — CEFTRIAXONE 2 G/1
2 INJECTION, POWDER, FOR SOLUTION INTRAMUSCULAR; INTRAVENOUS ONCE
Status: COMPLETED | OUTPATIENT
Start: 2021-01-01 | End: 2021-01-01

## 2021-01-01 RX ORDER — CEFEPIME HYDROCHLORIDE 1 G/1
1 INJECTION, POWDER, FOR SOLUTION INTRAMUSCULAR; INTRAVENOUS 3 TIMES DAILY
Status: DISCONTINUED | OUTPATIENT
Start: 2021-01-01 | End: 2021-01-01 | Stop reason: HOSPADM

## 2021-01-01 RX ORDER — HYDROMORPHONE HYDROCHLORIDE 1 MG/ML
0.5 INJECTION, SOLUTION INTRAMUSCULAR; INTRAVENOUS; SUBCUTANEOUS ONCE
Status: DISCONTINUED | OUTPATIENT
Start: 2021-01-01 | End: 2021-01-01 | Stop reason: HOSPADM

## 2021-01-01 RX ORDER — PROCHLORPERAZINE MALEATE 5 MG
5 TABLET ORAL EVERY 6 HOURS PRN
Status: DISCONTINUED | OUTPATIENT
Start: 2021-01-01 | End: 2021-01-01 | Stop reason: HOSPADM

## 2021-01-01 RX ORDER — DIAZEPAM 5 MG
5 TABLET ORAL ONCE
Status: DISCONTINUED | OUTPATIENT
Start: 2021-01-01 | End: 2021-01-01

## 2021-01-01 RX ORDER — OXYCODONE AND ACETAMINOPHEN 5; 325 MG/1; MG/1
1 TABLET ORAL ONCE
Status: COMPLETED | OUTPATIENT
Start: 2021-01-01 | End: 2021-01-01

## 2021-01-01 RX ORDER — CEFTRIAXONE 1 G/1
1 INJECTION, POWDER, FOR SOLUTION INTRAMUSCULAR; INTRAVENOUS DAILY
Status: DISCONTINUED | OUTPATIENT
Start: 2021-01-01 | End: 2021-01-01

## 2021-01-01 RX ORDER — CETIRIZINE HYDROCHLORIDE 10 MG/1
10 TABLET ORAL DAILY
Status: DISCONTINUED | OUTPATIENT
Start: 2021-01-01 | End: 2021-01-01 | Stop reason: HOSPADM

## 2021-01-01 RX ORDER — HALOPERIDOL 2 MG/ML
2 SOLUTION ORAL EVERY 6 HOURS PRN
Qty: 15 ML | Refills: 0 | Status: SHIPPED | OUTPATIENT
Start: 2021-01-01

## 2021-01-01 RX ORDER — PROCHLORPERAZINE MALEATE 10 MG
10 TABLET ORAL EVERY 6 HOURS PRN
COMMUNITY

## 2021-01-01 RX ORDER — MORPHINE SULFATE 10 MG/5ML
2 SOLUTION ORAL
Status: DISCONTINUED | OUTPATIENT
Start: 2021-01-01 | End: 2021-01-01 | Stop reason: HOSPADM

## 2021-01-01 RX ORDER — CYCLOBENZAPRINE HCL 10 MG
10 TABLET ORAL 3 TIMES DAILY PRN
Qty: 20 TABLET | Refills: 0 | Status: SHIPPED | OUTPATIENT
Start: 2021-01-01 | End: 2021-01-01

## 2021-01-01 RX ORDER — LIDOCAINE HYDROCHLORIDE 10 MG/ML
20 INJECTION, SOLUTION EPIDURAL; INFILTRATION; INTRACAUDAL; PERINEURAL ONCE
Status: COMPLETED | OUTPATIENT
Start: 2021-01-01 | End: 2021-01-01

## 2021-01-01 RX ORDER — VANCOMYCIN HYDROCHLORIDE 1 G/200ML
1000 INJECTION, SOLUTION INTRAVENOUS EVERY 12 HOURS
Status: DISCONTINUED | OUTPATIENT
Start: 2021-01-01 | End: 2021-01-01 | Stop reason: HOSPADM

## 2021-01-01 RX ORDER — MORPHINE SULFATE 2 MG/ML
1 INJECTION, SOLUTION INTRAMUSCULAR; INTRAVENOUS
Status: DISCONTINUED | OUTPATIENT
Start: 2021-01-01 | End: 2021-01-01 | Stop reason: HOSPADM

## 2021-01-01 RX ORDER — CEFDINIR 300 MG/1
300 CAPSULE ORAL 2 TIMES DAILY
Qty: 10 CAPSULE | Refills: 0 | Status: SHIPPED | OUTPATIENT
Start: 2021-01-01 | End: 2021-01-01

## 2021-01-01 RX ORDER — MORPHINE SULFATE 4 MG/ML
4 INJECTION, SOLUTION INTRAMUSCULAR; INTRAVENOUS ONCE
Status: COMPLETED | OUTPATIENT
Start: 2021-01-01 | End: 2021-01-01

## 2021-01-01 RX ORDER — POLYETHYLENE GLYCOL 3350 17 G/17G
17 POWDER, FOR SOLUTION ORAL DAILY PRN
Status: DISCONTINUED | OUTPATIENT
Start: 2021-01-01 | End: 2021-01-01 | Stop reason: HOSPADM

## 2021-01-01 RX ORDER — HALOPERIDOL 2 MG/ML
2 SOLUTION ORAL EVERY 6 HOURS PRN
Status: DISCONTINUED | OUTPATIENT
Start: 2021-01-01 | End: 2021-01-01 | Stop reason: HOSPADM

## 2021-01-01 RX ORDER — OXYCODONE HYDROCHLORIDE 5 MG/1
5 TABLET ORAL EVERY 6 HOURS PRN
Qty: 12 TABLET | Refills: 0 | Status: SHIPPED | OUTPATIENT
Start: 2021-01-01 | End: 2021-01-01

## 2021-01-01 RX ORDER — LORAZEPAM 2 MG/ML
1 CONCENTRATE ORAL EVERY 4 HOURS PRN
Status: DISCONTINUED | OUTPATIENT
Start: 2021-01-01 | End: 2021-01-01 | Stop reason: HOSPADM

## 2021-01-01 RX ORDER — PROCHLORPERAZINE 25 MG
12.5 SUPPOSITORY, RECTAL RECTAL EVERY 12 HOURS PRN
Status: DISCONTINUED | OUTPATIENT
Start: 2021-01-01 | End: 2021-01-01 | Stop reason: HOSPADM

## 2021-01-01 RX ORDER — AMOXICILLIN 250 MG
2 CAPSULE ORAL 2 TIMES DAILY PRN
Status: DISCONTINUED | OUTPATIENT
Start: 2021-01-01 | End: 2021-01-01 | Stop reason: HOSPADM

## 2021-01-01 RX ORDER — BISACODYL 10 MG
10 SUPPOSITORY, RECTAL RECTAL DAILY PRN
Qty: 1 SUPPOSITORY | Refills: 0 | Status: SHIPPED | OUTPATIENT
Start: 2021-01-01

## 2021-01-01 RX ORDER — LIDOCAINE 40 MG/G
CREAM TOPICAL
Status: DISCONTINUED | OUTPATIENT
Start: 2021-01-01 | End: 2021-01-01 | Stop reason: HOSPADM

## 2021-01-01 RX ADMIN — LORAZEPAM 1 MG: 2 INJECTION INTRAMUSCULAR; INTRAVENOUS at 06:07

## 2021-01-01 RX ADMIN — CEFTRIAXONE SODIUM 2 G: 2 INJECTION, POWDER, FOR SOLUTION INTRAMUSCULAR; INTRAVENOUS at 13:29

## 2021-01-01 RX ADMIN — SODIUM CHLORIDE: 9 INJECTION, SOLUTION INTRAVENOUS at 23:19

## 2021-01-01 RX ADMIN — GADOBUTROL 7 ML: 604.72 INJECTION INTRAVENOUS at 08:36

## 2021-01-01 RX ADMIN — CEFTRIAXONE 1 G: 1 INJECTION, POWDER, FOR SOLUTION INTRAMUSCULAR; INTRAVENOUS at 07:34

## 2021-01-01 RX ADMIN — MORPHINE SULFATE 4 MG: 4 INJECTION INTRAVENOUS at 14:39

## 2021-01-01 RX ADMIN — FENTANYL CITRATE 50 MCG: 50 INJECTION, SOLUTION INTRAMUSCULAR; INTRAVENOUS at 09:36

## 2021-01-01 RX ADMIN — IOPAMIDOL 75 ML: 755 INJECTION, SOLUTION INTRAVENOUS at 22:48

## 2021-01-01 RX ADMIN — CLONAZEPAM 0.5 MG: 0.5 TABLET ORAL at 18:56

## 2021-01-01 RX ADMIN — AZITHROMYCIN 250 MG: 250 TABLET, FILM COATED ORAL at 09:41

## 2021-01-01 RX ADMIN — CLONAZEPAM 0.5 MG: 0.5 TABLET ORAL at 18:20

## 2021-01-01 RX ADMIN — IOPAMIDOL 55 ML: 755 INJECTION, SOLUTION INTRAVENOUS at 11:51

## 2021-01-01 RX ADMIN — ENOXAPARIN SODIUM 40 MG: 40 INJECTION SUBCUTANEOUS at 09:41

## 2021-01-01 RX ADMIN — DIAZEPAM 2 MG: 2 TABLET ORAL at 03:06

## 2021-01-01 RX ADMIN — SODIUM CHLORIDE, POTASSIUM CHLORIDE, SODIUM LACTATE AND CALCIUM CHLORIDE: 600; 310; 30; 20 INJECTION, SOLUTION INTRAVENOUS at 00:13

## 2021-01-01 RX ADMIN — SODIUM CHLORIDE 1000 ML: 9 INJECTION, SOLUTION INTRAVENOUS at 12:02

## 2021-01-01 RX ADMIN — CEFEPIME HYDROCHLORIDE 1 G: 1 INJECTION, POWDER, FOR SOLUTION INTRAMUSCULAR; INTRAVENOUS at 07:39

## 2021-01-01 RX ADMIN — ENOXAPARIN SODIUM 40 MG: 40 INJECTION SUBCUTANEOUS at 10:06

## 2021-01-01 RX ADMIN — MORPHINE SULFATE 1 MG: 2 INJECTION, SOLUTION INTRAMUSCULAR; INTRAVENOUS at 02:35

## 2021-01-01 RX ADMIN — CETIRIZINE HYDROCHLORIDE 10 MG: 10 TABLET, FILM COATED ORAL at 07:39

## 2021-01-01 RX ADMIN — MORPHINE SULFATE 2 MG: 10 SOLUTION ORAL at 16:43

## 2021-01-01 RX ADMIN — AZITHROMYCIN 250 MG: 250 TABLET, FILM COATED ORAL at 10:06

## 2021-01-01 RX ADMIN — CEFEPIME HYDROCHLORIDE 1 G: 1 INJECTION, POWDER, FOR SOLUTION INTRAMUSCULAR; INTRAVENOUS at 16:12

## 2021-01-01 RX ADMIN — CEFEPIME HYDROCHLORIDE 1 G: 1 INJECTION, POWDER, FOR SOLUTION INTRAMUSCULAR; INTRAVENOUS at 08:40

## 2021-01-01 RX ADMIN — MIDAZOLAM 1 MG: 1 INJECTION INTRAMUSCULAR; INTRAVENOUS at 09:37

## 2021-01-01 RX ADMIN — ENOXAPARIN SODIUM 40 MG: 40 INJECTION SUBCUTANEOUS at 08:40

## 2021-01-01 RX ADMIN — SODIUM CHLORIDE: 9 INJECTION, SOLUTION INTRAVENOUS at 05:48

## 2021-01-01 RX ADMIN — CLONAZEPAM 0.5 MG: 0.5 TABLET ORAL at 08:58

## 2021-01-01 RX ADMIN — AZITHROMYCIN 250 MG: 250 TABLET, FILM COATED ORAL at 09:12

## 2021-01-01 RX ADMIN — CEFEPIME HYDROCHLORIDE 1 G: 1 INJECTION, POWDER, FOR SOLUTION INTRAMUSCULAR; INTRAVENOUS at 15:55

## 2021-01-01 RX ADMIN — CETIRIZINE HYDROCHLORIDE 10 MG: 10 TABLET, FILM COATED ORAL at 10:06

## 2021-01-01 RX ADMIN — LORAZEPAM 0.5 MG: 2 INJECTION INTRAMUSCULAR; INTRAVENOUS at 10:08

## 2021-01-01 RX ADMIN — VANCOMYCIN HYDROCHLORIDE 1000 MG: 1 INJECTION, SOLUTION INTRAVENOUS at 06:05

## 2021-01-01 RX ADMIN — SODIUM CHLORIDE, POTASSIUM CHLORIDE, SODIUM LACTATE AND CALCIUM CHLORIDE: 600; 310; 30; 20 INJECTION, SOLUTION INTRAVENOUS at 10:04

## 2021-01-01 RX ADMIN — CEFEPIME HYDROCHLORIDE 1 G: 1 INJECTION, POWDER, FOR SOLUTION INTRAMUSCULAR; INTRAVENOUS at 22:13

## 2021-01-01 RX ADMIN — VANCOMYCIN HYDROCHLORIDE 1000 MG: 1 INJECTION, SOLUTION INTRAVENOUS at 18:18

## 2021-01-01 RX ADMIN — VANCOMYCIN HYDROCHLORIDE 1000 MG: 1 INJECTION, SOLUTION INTRAVENOUS at 18:45

## 2021-01-01 RX ADMIN — AZITHROMYCIN 250 MG: 250 TABLET, FILM COATED ORAL at 08:58

## 2021-01-01 RX ADMIN — CETIRIZINE HYDROCHLORIDE 10 MG: 10 TABLET, FILM COATED ORAL at 08:58

## 2021-01-01 RX ADMIN — IOPAMIDOL 80 ML: 755 INJECTION, SOLUTION INTRAVENOUS at 08:13

## 2021-01-01 RX ADMIN — CEFTRIAXONE 1 G: 1 INJECTION, POWDER, FOR SOLUTION INTRAMUSCULAR; INTRAVENOUS at 06:20

## 2021-01-01 RX ADMIN — SODIUM CHLORIDE 60 ML: 9 INJECTION, SOLUTION INTRAVENOUS at 08:13

## 2021-01-01 RX ADMIN — SODIUM CHLORIDE 59 ML: 9 INJECTION, SOLUTION INTRAVENOUS at 22:48

## 2021-01-01 RX ADMIN — AZITHROMYCIN MONOHYDRATE 500 MG: 500 INJECTION, POWDER, LYOPHILIZED, FOR SOLUTION INTRAVENOUS at 14:13

## 2021-01-01 RX ADMIN — CEFEPIME HYDROCHLORIDE 1 G: 1 INJECTION, POWDER, FOR SOLUTION INTRAMUSCULAR; INTRAVENOUS at 21:23

## 2021-01-01 RX ADMIN — MORPHINE SULFATE 2 MG: 10 SOLUTION ORAL at 04:55

## 2021-01-01 RX ADMIN — ENOXAPARIN SODIUM 40 MG: 40 INJECTION SUBCUTANEOUS at 08:58

## 2021-01-01 RX ADMIN — IBUPROFEN 600 MG: 600 TABLET, FILM COATED ORAL at 03:08

## 2021-01-01 RX ADMIN — VANCOMYCIN HYDROCHLORIDE 1000 MG: 1 INJECTION, SOLUTION INTRAVENOUS at 17:47

## 2021-01-01 RX ADMIN — MORPHINE SULFATE 2 MG: 10 SOLUTION ORAL at 08:12

## 2021-01-01 RX ADMIN — LIDOCAINE HYDROCHLORIDE 5 ML: 10 INJECTION, SOLUTION EPIDURAL; INFILTRATION; INTRACAUDAL; PERINEURAL at 09:38

## 2021-01-01 RX ADMIN — ENOXAPARIN SODIUM 40 MG: 40 INJECTION SUBCUTANEOUS at 09:12

## 2021-01-01 RX ADMIN — SODIUM CHLORIDE, POTASSIUM CHLORIDE, SODIUM LACTATE AND CALCIUM CHLORIDE: 600; 310; 30; 20 INJECTION, SOLUTION INTRAVENOUS at 17:03

## 2021-01-01 RX ADMIN — ENOXAPARIN SODIUM 40 MG: 40 INJECTION SUBCUTANEOUS at 07:39

## 2021-01-01 RX ADMIN — CETIRIZINE HYDROCHLORIDE 10 MG: 10 TABLET, FILM COATED ORAL at 13:29

## 2021-01-01 RX ADMIN — CEFTRIAXONE 1 G: 1 INJECTION, POWDER, FOR SOLUTION INTRAMUSCULAR; INTRAVENOUS at 09:13

## 2021-01-01 RX ADMIN — CETIRIZINE HYDROCHLORIDE 10 MG: 10 TABLET, FILM COATED ORAL at 08:40

## 2021-01-01 RX ADMIN — Medication 1 MG: at 21:21

## 2021-01-01 RX ADMIN — OXYCODONE HYDROCHLORIDE AND ACETAMINOPHEN 1 TABLET: 5; 325 TABLET ORAL at 03:06

## 2021-01-01 RX ADMIN — ACETAMINOPHEN 650 MG: 650 SUPPOSITORY RECTAL at 21:02

## 2021-01-01 RX ADMIN — VANCOMYCIN HYDROCHLORIDE 1000 MG: 1 INJECTION, SOLUTION INTRAVENOUS at 05:48

## 2021-01-01 RX ADMIN — SODIUM CHLORIDE: 9 INJECTION, SOLUTION INTRAVENOUS at 18:17

## 2021-01-01 RX ADMIN — MORPHINE SULFATE 2 MG: 10 SOLUTION ORAL at 21:02

## 2021-01-01 RX ADMIN — SODIUM CHLORIDE: 9 INJECTION, SOLUTION INTRAVENOUS at 10:06

## 2021-01-01 RX ADMIN — MORPHINE SULFATE 2 MG: 10 SOLUTION ORAL at 00:25

## 2021-01-01 RX ADMIN — CEFTRIAXONE 1 G: 1 INJECTION, POWDER, FOR SOLUTION INTRAMUSCULAR; INTRAVENOUS at 06:23

## 2021-01-01 RX ADMIN — VANCOMYCIN HYDROCHLORIDE 1000 MG: 1 INJECTION, SOLUTION INTRAVENOUS at 06:18

## 2021-01-01 ASSESSMENT — ENCOUNTER SYMPTOMS
CHILLS: 0
SHORTNESS OF BREATH: 0
DIARRHEA: 0
DIARRHEA: 0
ABDOMINAL PAIN: 1
NAUSEA: 0
HEADACHES: 0
DIFFICULTY URINATING: 0
COUGH: 0
VOMITING: 0
SHORTNESS OF BREATH: 1
HEMATURIA: 0
SINUS PRESSURE: 1
CHILLS: 0
SORE THROAT: 0
COUGH: 1
FLANK PAIN: 1
FEVER: 0
WHEEZING: 0
NECK PAIN: 1
DYSURIA: 0
VOMITING: 0
FEVER: 0
NUMBNESS: 0
FREQUENCY: 0

## 2021-01-01 ASSESSMENT — ACTIVITIES OF DAILY LIVING (ADL)
ADLS_ACUITY_SCORE: 12
ADLS_ACUITY_SCORE: 14
ADLS_ACUITY_SCORE: 13
ADLS_ACUITY_SCORE: 12
ADLS_ACUITY_SCORE: 16
ADLS_ACUITY_SCORE: 14
ADLS_ACUITY_SCORE: 12
ADLS_ACUITY_SCORE: 14
ADLS_ACUITY_SCORE: 21
ADLS_ACUITY_SCORE: 14
ADLS_ACUITY_SCORE: 14
ADLS_ACUITY_SCORE: 17
ADLS_ACUITY_SCORE: 16
ADLS_ACUITY_SCORE: 12
ADLS_ACUITY_SCORE: 12
ADLS_ACUITY_SCORE: 14
ADLS_ACUITY_SCORE: 17
ADLS_ACUITY_SCORE: 12
ADLS_ACUITY_SCORE: 13
ADLS_ACUITY_SCORE: 12
ADLS_ACUITY_SCORE: 12
DEPENDENT_IADLS:: INDEPENDENT
ADLS_ACUITY_SCORE: 17
ADLS_ACUITY_SCORE: 12
ADLS_ACUITY_SCORE: 12
ADLS_ACUITY_SCORE: 13
ADLS_ACUITY_SCORE: 14
ADLS_ACUITY_SCORE: 16
ADLS_ACUITY_SCORE: 12
ADLS_ACUITY_SCORE: 17
ADLS_ACUITY_SCORE: 14
ADLS_ACUITY_SCORE: 13
ADLS_ACUITY_SCORE: 12
ADLS_ACUITY_SCORE: 12
ADLS_ACUITY_SCORE: 15
ADLS_ACUITY_SCORE: 14
ADLS_ACUITY_SCORE: 21
ADLS_ACUITY_SCORE: 17
DEPENDENT_IADLS:: CLEANING;COOKING;LAUNDRY;SHOPPING;MEAL PREPARATION;MEDICATION MANAGEMENT;TRANSPORTATION;MONEY MANAGEMENT
ADLS_ACUITY_SCORE: 12
ADLS_ACUITY_SCORE: 12

## 2021-01-01 ASSESSMENT — MIFFLIN-ST. JEOR: SCORE: 1329.78

## 2021-04-07 NOTE — ED PROVIDER NOTES
History   Chief Complaint:  Neck Pain       The history is provided by the patient.      Jesus Alberto Porras is a 75 year old male who presents with nonradiating stabbing pain to his posterior neck. The patient has had chronic neck pain with known arthritis for at least 10 years, and gets regular massages for this. He met with his chiropractor 04/05, who worked his muscles. He denies undergoing any cracking, manipulation, or readjusting of his neck during this visit, and was asked to confirm this multiple times. The patient's pain has worsened since his appointment. He is scheduled for a follow up with his chiropractor tomorrow to have this corrected, but is seeking pain medication to get him through the night. The patient denies any headache, arm numbness, or recent trauma or injury.    Review of Systems   Musculoskeletal: Positive for neck pain.   Neurological: Negative for numbness (arms) and headaches.   All other systems reviewed and are negative.      Allergies:  Amoxicillin  Phenytoin  Carbamazepine    Medications:  Clonazepam  Levetiracetam  Senna docusate    Past Medical History:    GERD  Seizures    Past Surgical History:    Cholecystectomy  Craniotomy  Eye surgery  Lumbar drain insertion    Social History:  The patient was accompanied to the ED by his wife.  PCP: Wesly Jama  Marital status:     Physical Exam     Patient Vitals for the past 24 hrs:   BP Temp Pulse Resp SpO2   04/07/21 0313 125/79 -- 89 18 96 %   04/07/21 0135 126/89 98  F (36.7  C) 94 18 91 %       Physical Exam  Constitutional:  Oriented to person, place, and time. Well appearing.  HENT:   Head:    Normocephalic.   Mouth/Throat:   Oropharynx is clear and moist.   Eyes:    EOM are normal. Pupils are equal, round, and reactive to light.   Neck:    Neck supple.   Cardiovascular:  Normal rate, regular rhythm and normal heart sounds.      Exam reveals no gallop and no friction rub.       No murmur heard.  Pulmonary/Chest:  Effort  normal and breath sounds normal.      No respiratory distress. No wheezes. No rales.      No reproducible chest wall pain.  Abdominal:   Soft. No distension. No tenderness. No rebound and no guarding.   Musculoskeletal:  Normal range of motion. No midline spinal tenderness. General paracervical spinal tenderness.  Neurological:   Alert and oriented to person, place, and time.           Moves all 4 extremities spontaneously       5/5 strength in all 4 extremities.  Sensation intact to light touch in all 4 extremities.  Skin:    No rash noted. No pallor.     Emergency Department Course     ECG:  Indication: Neck pain  Completed at 0149.  Read at 0211.   Normal sinus rhythm   Rate 96 bpm. VA interval 146. QRS duration 92. QT/QTc 352/444. P-R-T axes 28 40 21.  Agree with computer interpretation.     Emergency Department Course:    Reviewed:  I reviewed the patient's nursing notes, vitals, past medical history and care everywhere.     Assessments:  0244 I performed an exam of the patient in room 10 as documented above.  0318 Patient rechecked and updated.      Interventions:  0306 Percocet 5-325 1 tab PO  0306 Valium 2 mg PO  0308 Ibuprofen 600 mg PO    Disposition:  The patient was discharged to home.     Impression & Plan         Medical Decision Making:  Jesus Alberto Porras is a 75 year old male that came in complaining of chronic neck pain. He does state that he sees a chiropractor but denies any cracking, any high-velocity manipulation, and is complaining of exacerbation of his ongoing chronic symptoms. He denies any weakness or numbness, any loss of control of bladder or bowel, therefore no concerns for neurologic compromise and need for MRI imaging. As atraumatic, I would doubt a fracture, therefor I do not believe he needs any X-ray imaging. As he did not have any manipulation and has no unilateral symptoms, any weakness, numbness, any neurologic deficits, I would highly doubt carotid or vertebral dissection or need  for CT imaging. He will be treated as exacerbated of his chronic neck pain and is being given a dose of pain medication and muscle relaxers. He will be discharged home with further muscles relaxers and referred for physical therapy. He was told to return for any new weakness, numbness, worsening neck pain, or other new symptoms.    Diagnosis:    ICD-10-CM    1. Chronic neck pain  M54.2     G89.29        Discharge Medications:   Discharge Medication List as of 4/7/2021  3:26 AM      START taking these medications    Details   cyclobenzaprine (FLEXERIL) 10 MG tablet Take 1 tablet (10 mg) by mouth 3 times daily as needed, Disp-20 tablet, R-0, Local Print      methylPREDNISolone (MEDROL DOSEPAK) 4 MG tablet therapy pack Take 1 tablet (4 mg) by mouth See Admin Instructions, Disp-21 tablet, R-0, Local Print             Scribe Disclosure:  I, Shanice Fuller, am serving as a scribe at 2:44 AM on 4/7/2021 to document services personally performed by Roque Young MD based on my observations and the provider's statements to me.     Shanice Fuller  4/7/2021   [facility] EMERGENCY DEPARTMENT         Roque Young MD  04/08/21 0158

## 2021-04-07 NOTE — ED TRIAGE NOTES
"Patient reports he was having neck pain went to the chiropracter and had neck adjusted on Monday. Since then pain has been progressively worse. Denies  Numbness to extremities, blurry vision  States, \" I feel it all around my neck and it feels like an ice pick\"    HA on and before being adjusted.     ABC intact     C- collar placed   "

## 2021-05-02 NOTE — ED TRIAGE NOTES
Pt states RUQ abdominal pain for one week pta significant worse tonight. Pt denies n/v/d. ABCs intact GCS 15

## 2021-05-02 NOTE — ED PROVIDER NOTES
"  History   Chief Complaint:  Abdominal Pain    The history is provided by the patient.     Jesus Alberto Porras is a 75 year old male who presents for evaluation of right upper quadrant abdominal pain. Over the last 1-2 weeks, he reports feeling an ache in this location which felt no worse than a \"pulled muscle.\" This evening, he reports this pain acutely worsened. He states it feels like he is being stabbed with an ice pick. Given this drastic worsening, he decided to present to the ED. Here, he reports stabbing right upper quadrant pain which radiates into his flank. He denies associated nausea, vomiting, diarrhea, fever/chills, hematuria, dysuria, urinary frequency, or scrotal/testicular pain. He also denies chest pain, shortness of breath, or recent cough. He denies any falls or other trauma preceding this pain. He has undergone a cholecystectomy, but denies other abdominal surgery. He has no prior history of kidney stones. He does report chronic \"stomach issues\" which have seemed to worsen in the last 6 weeks. He now states even drinking regular soda will cause him to have a stomachache.     Allergies:  Dilantin [Phenytoin]  Amoxicillin  Carbamazepine    Medications:    Klonopin     Past Medical History:    CSF leak from ear   GERD  Seizure disorder  Temporal encephalocele     Past Surgical History:    Cholecystectomy   Craniotomy, insert lumbar drain   Cataract extraction, bilateral     Family History:    He denies past family history.     Social History:  Escorted to the ED by a friend.   Former smoker.     Review of Systems   Constitutional: Negative for chills and fever.   Respiratory: Negative for cough and shortness of breath.    Cardiovascular: Negative for chest pain.   Gastrointestinal: Positive for abdominal pain. Negative for diarrhea, nausea and vomiting.   Genitourinary: Positive for flank pain. Negative for decreased urine volume, difficulty urinating, dysuria, frequency, hematuria, penile pain and " testicular pain.   All other systems reviewed and are negative.    Physical Exam     Patient Vitals for the past 24 hrs:   BP Temp Temp src Pulse Resp SpO2   05/01/21 2245 -- -- -- -- -- 94 %   05/01/21 2230 (!) 136/94 -- -- 90 -- 94 %   05/01/21 2200 (!) 147/104 -- -- 88 -- 93 %   05/01/21 2145 (!) 155/107 -- -- 89 -- 93 %   05/01/21 2057 (!) 173/115 97.9  F (36.6  C) Oral 88 20 94 %      Physical Exam  Vitals signs and nursing note reviewed.   Constitutional:       General: He is not in acute distress.     Appearance: He is not diaphoretic.   HENT:      Head: Atraumatic.   Eyes:      General: No scleral icterus.     Pupils: Pupils are equal, round, and reactive to light.   Cardiovascular:      Rate and Rhythm: Normal rate and regular rhythm.   Pulmonary:      Effort: Pulmonary effort is normal. No respiratory distress.   Abdominal:      General: Bowel sounds are normal.      Palpations: Abdomen is soft.      Tenderness: There is no abdominal tenderness. There is no right CVA tenderness or left CVA tenderness.   Musculoskeletal:         General: No tenderness.   Skin:     General: Skin is warm.      Findings: No rash.   Neurological:      Mental Status: He is alert.       Emergency Department Course     Imaging:  CT Abdomen Pelvis w Contrast:  1. Innumerable hypoenhancing hepatic masses, highly suggestive of metastatic neoplasm.  2. Ill-defined abnormal soft tissue attenuation about the distal pancreatic body and tail in addition to a few enlarged peripancreatic lymph nodes. Given the aforementioned hepatic metastasis, these findings are suspicious for a primary pancreatic neoplasm and lymph node metastases as a cause of the hepatic metastases. Less likely, an inflammatory process could have this appearance.  3. No other acute abnormality identified in the abdomen or pelvis.   Reading per radiology.      Laboratory:  CBC: WBC: 12.5 (H), HGB: 13.6, PLT: 250  CMP: Glucose 123 (H), Anion gap: 1 (L), Albumin: 3.0 (L),  Alkaline phosphatase: 179 (H), AST: 55 (H), o/w WNL (Creatinine: 0.80)    Lipase: 255    Lactic acid (Resulted at 2236): 0.8    UA with Microscopic: Ketones: Trace (A), o/w WNL    Emergency Department Course:    Reviewed:  I reviewed the patient's nursing notes, vitals, past medical records, and Care Everywhere.     Assessments:  2206: I performed an exam of the patient, as documented above. History obtained and plan for ED work up discussed as well.   2325: I reassessed the patient and discussed the results of the work up and recommendations for home.  2335: I placed a message for the oncology Fast Pass system to expedite his future care.     Disposition:  The patient was discharged to home.     Impression & Plan      Medical Decision Making:   Jesus Alberto Porras is a 75 year old male who presents with R sided abdominal/flank pain. Here he has a benign abdominal examination and reassuring vital signs. He has ill defined symptoms and it is rather difficult to determine potential etiology of his symptoms. Consideration for upper GI etiology,  source as primary potential causes. He is demonstrated on imaging to have findings of potential malignancy, primary pancreatic cancer with metastasis. Aside from this there are no acute findings of his diagnostic evaluation warranting further intervention. He was made aware of his concerning imaging, need for follow up. All of his questions answered, outpatient follow up arranged and FastPass for [facility] occurred. Discharged with symptomatic management.      Diagnosis:     ICD-10-CM    1. Abnormal CT of liver  R93.2    2. Pancreatic mass  K86.89    3. Liver mass  R16.0        Discharge Medications:  New Prescriptions    DOCUSATE SODIUM (COLACE) 50 MG CAPSULE    Take 1 capsule (50 mg) by mouth 2 times daily for 14 days    OXYCODONE (ROXICODONE) 5 MG TABLET    Take 1 tablet (5 mg) by mouth every 6 hours as needed for pain      Scribe Disclosure:  I, Yudi Vital, am serving as  a scribe on 5/1/2021 at 10:06 PM to personally document services performed by Faisal Farris PA-C, based on my observations and the provider's statements to me.      5/1/2021   EMERGENCY DEPARTMENT     Faisal Farris PA-C  05/02/21 0016

## 2021-05-13 NOTE — PROCEDURES
[facility]    Procedure: US Liver biopsy    Date/Time: 5/13/2021 9:50 AM  Performed by: Freddie Cantu MD  Authorized by: Freddie Cantu MD     UNIVERSAL PROTOCOL   Site Marked: Yes  Prior Images Obtained and Reviewed:  Yes  Required items: Required blood products, implants, devices and special equipment available    Patient identity confirmed:  Verbally with patient  Patient was reevaluated immediately before administering moderate or deep sedation or anesthesia  Confirmation Checklist:  Patient's identity using two indicators, relevant allergies, procedure was appropriate and matched the consent or emergent situation and correct equipment/implants were available  Time out: Immediately prior to the procedure a time out was called    Universal Protocol: the Joint Commission Universal Protocol was followed    Preparation: Patient was prepped and draped in usual sterile fashion    ESBL (mL):  5         ANESTHESIA    Anesthesia: Local infiltration  Local Anesthetic:  Lidocaine 1% without epinephrine  Anesthetic Total (mL):  5      SEDATION    Patient Sedated: Yes    Sedation Type:  Moderate (conscious) sedation  Sedation:  Fentanyl, midazolam and see MAR for details  Vital signs: Vital signs monitored during sedation    See dictated procedure note for full details.  PROCEDURE   Patient Tolerance:  Patient tolerated the procedure well with no immediate complications  Describe Procedure:     Successful targeted US guided liver biopsy.  5 core samples were taken from a lesion in the right hepatic lobe.      No complications.    Length of time physician/provider present for 1:1 monitoring during sedation: 10

## 2021-05-13 NOTE — PRE-PROCEDURE
GENERAL PRE-PROCEDURE:   Procedure:  US liver biopsy  Date/Time:  5/13/2021 9:50 AM    Written consent obtained?: Yes    Risks and benefits: Risks, benefits and alternatives were discussed    Consent given by:  Patient  Patient states understanding of procedure being performed: Yes    Patient's understanding of procedure matches consent: Yes    Procedure consent matches procedure scheduled: Yes    Expected level of sedation:  Moderate  Appropriately NPO:  Yes  ASA Class:  Class 2- mild systemic disease, no acute problems, no functional limitations  Mallampati  :  Grade 2- soft palate, base of uvula, tonsillar pillars, and portion of posterior pharyngeal wall visible  Lungs:  Lungs clear with good breath sounds bilaterally  Heart:  Normal heart sounds and rate  History & Physical reviewed:  History and physical reviewed and no updates needed  Statement of review:  I have reviewed the lab findings, diagnostic data, medications, and the plan for sedation

## 2021-05-13 NOTE — SEDATION DOCUMENTATION
Pt was here for a liver biopsy in US, Dr. Cantu performed procedure, pt tolerated well and 1 mg Versed and 50 mcg Fentanyl were used for sedation. Will recover in Radiology. Procedure complete

## 2021-05-13 NOTE — PROGRESS NOTES
Pt recovered well from his biopsy and had no pain at all post procedure, instructions reviewed and he left in satisfactory condition with his wife.

## 2021-05-30 PROBLEM — J18.9 PNEUMONIA DUE TO INFECTIOUS ORGANISM, UNSPECIFIED LATERALITY, UNSPECIFIED PART OF LUNG: Status: ACTIVE | Noted: 2021-01-01

## 2021-05-30 PROBLEM — J96.01 ACUTE RESPIRATORY FAILURE WITH HYPOXIA (H): Status: ACTIVE | Noted: 2021-01-01

## 2021-05-30 NOTE — ED NOTES
[facility]  ED Nurse Handoff Report    Jesus Alberto Porras is a 75 year old male   ED Chief complaint: Shortness of Breath and Nasal Congestion  . ED Diagnosis:   Final diagnoses:   None     Allergies:   Allergies   Allergen Reactions     Dilantin [Phenytoin] Rash     Amoxicillin Rash     Carbamazepine Rash       Code Status: Full Code  Activity level - Baseline/Home:  Independent. Activity Level - Current:   Stand by Assist. Lift room needed: No. Bariatric: No   Needed: No   Isolation: No. Infection: Not Applicable.     Vital Signs:   Vitals:    05/30/21 1130 05/30/21 1300 05/30/21 1315 05/30/21 1330   BP: (!) 154/100      Pulse: 97      Resp:       Temp:       SpO2: 98% 97% 95% 96%       Cardiac Rhythm:  ,      Pain level:    Patient confused: No. Patient Falls Risk: Yes.   Elimination Status: Has voided   Patient Report - Initial Complaint: SOB. Focused Assessment: requiring 1 L O2 at rest, tachypnic  Tests Performed: CT, blood work, blood cultures. Abnormal Results:   Labs Ordered and Resulted from Time of ED Arrival Up to the Time of Departure from the ED   CBC WITH PLATELETS DIFFERENTIAL - Abnormal; Notable for the following components:       Result Value    WBC 17.0 (*)     Hemoglobin 12.8 (*)     MCHC 31.0 (*)     Absolute Neutrophil 13.9 (*)     Absolute Monocytes 1.6 (*)     All other components within normal limits   COMPREHENSIVE METABOLIC PANEL - Abnormal; Notable for the following components:    Carbon Dioxide 34 (*)     Anion Gap 2 (*)     Glucose 142 (*)     Albumin 2.5 (*)     Alkaline Phosphatase 391 (*)      (*)     All other components within normal limits   TROPONIN I   NT PROBNP INPATIENT   LACTIC ACID WHOLE BLOOD   SARS-COV-2 (COVID-19) VIRUS RT-PCR   PERIPHERAL IV CATHETER   BLOOD CULTURE   BLOOD CULTURE     CT Chest Pulmonary Embolism w Contrast   Final Result   IMPRESSION:   1.  Stable to slight interval decreased size of the right lung base   mass. Enlarged  lymph nodes in the mediastinum and hilar regions appear   stable. No new lung lesions.   2.  No evidence of pulmonary embolism. Thoracic aorta is unremarkable.   3.  Pancreatic tail mass and liver metastases described on prior   abdomen/pelvis CT are only partially imaged on this exam.      KATHY CONTRERAS MD        .   Treatments provided: ceftriaxone, azithromycin, bolus  Family Comments: NA  OBS brochure/video discussed/provided to patient:  No  ED Medications:   Medications   cefTRIAXone (ROCEPHIN) 2 g vial to attach to  ml bag for ADULTS or NS 50 ml bag for PEDS (2 g Intravenous New Bag 5/30/21 1329)   azithromycin 500 mg (ZITHROMAX) in 0.9% NaCl 250 mL intermittent infusion 500 mg (has no administration in time range)   0.9% sodium chloride BOLUS (0 mLs Intravenous Stopped 5/30/21 1320)   iopamidol (ISOVUE-370) solution 55 mL (55 mLs Intravenous Given 5/30/21 1151)   sodium chloride (PF) 0.9% PF flush 76 mL (76 mLs Intravenous Given 5/30/21 1151)     Drips infusing:  No  For the majority of the shift, the patient's behavior Green. Interventions performed were NA.    Sepsis treatment initiated: No     Patient tested for COVID 19 prior to admission: YES- negative    ED Nurse Name/Phone Number: Denia Sebastian RN,   1:40 PM    RECEIVING UNIT ED HANDOFF REVIEW    Above ED Nurse Handoff Report was reviewed: YES  Reviewed by: Philomena Bass RN on May 30, 2021 at 2:57 PM

## 2021-05-30 NOTE — ED PROVIDER NOTES
"  History     Chief Complaint:  Shortness of Breath      The history is provided by the patient.      Jesus Alberto Porras is a 75 year old male recently diagnosed with pancreatic cancer who presents with shortness of breath and nasal congestion starting 2-3 days ago. The patient reports he is also experiencing coughing and throat pain due to the violent coughing. He denies a past medical history of heart problems, COPD, asthma, and smoking. He also notes that he has not received the COVID-19 vaccine or been in contact with anyone with COVID-19. He also notes that he has not yet started treatment for his pancreatic cancer. He denies fever, chills, sore throat, diarrhea, vomiting, chest pain, and wheezing.     Review of Systems   Constitutional: Negative for chills and fever.   HENT: Positive for congestion and sinus pressure. Negative for sore throat.    Respiratory: Positive for cough and shortness of breath. Negative for wheezing.    Cardiovascular: Negative for chest pain.   Gastrointestinal: Negative for diarrhea and vomiting.   All other systems reviewed and are negative.      Allergies:  Dilantin   Amoxicillin  Carbamazepine    Medications:    Cetirizine  clonazepam   cyclobenzaprine   levetiracetam     Past Medical History:    GERD  Seizures  Cataract  Arthritis  Pancreatic cancer    Past Surgical History:    Cholecystectomy  Craniotomy  Eye surgery  Insert drainlunbar    Social History:  Patient presents alone.     Physical Exam     Patient Vitals for the past 24 hrs:   BP Temp Temp src Pulse Resp SpO2 Height Weight   05/30/21 1530 137/80 97.6  F (36.4  C) Oral 96 20 90 % 1.74 m (5' 8.5\") 62.2 kg (137 lb 3.2 oz)   05/30/21 1500 -- -- -- -- -- 94 % -- --   05/30/21 1445 -- -- -- -- -- 98 % -- --   05/30/21 1430 -- -- -- -- -- 96 % -- --   05/30/21 1415 (!) 146/85 -- -- -- -- 96 % -- --   05/30/21 1400 -- -- -- -- -- 96 % -- --   05/30/21 1345 -- -- -- -- -- 96 % -- --   05/30/21 1330 -- -- -- -- -- 96 % -- -- "   05/30/21 1315 -- -- -- -- -- 95 % -- --   05/30/21 1300 -- -- -- -- -- 97 % -- --   05/30/21 1130 (!) 154/100 -- -- 97 -- 98 % -- --   05/30/21 1115 (!) 156/99 -- -- 98 -- 99 % -- --   05/30/21 1100 (!) 178/107 -- -- 100 -- 99 % -- --   05/30/21 1048 (!) 161/89 97.4  F (36.3  C) -- 104 (!) 32 91 % -- --       Physical Exam  Constitutional: Nontoxic appearing.  HEENT: Atraumatic. Moist mucous membranes.  Neck: Soft.  Supple.  No JVD.  Cardiac: Regular rate and rhythm.  No murmur or rub.  Respiratory: He exhibits tachypnea and 1-2 word sentences at times and appears dyspneic with exertion.  Clear to auscultation bilaterally.  No wheezing, rhonchi, or rales.  Abdomen: Soft and nontender.  No rebound or guarding.  Nondistended.  Musculoskeletal: No edema.  Normal range of motion.  Neurologic: Alert and oriented.  Normal tone and bulk. Normal gait.  Skin: No rashes.  No edema.  Psych: Normal affect.  Normal behavior.      Emergency Department Course   ECG:  ECG taken at 1114, ECG read at 1125  Normal sinus rhythm. Normal ECG.   Rate 98 bpm. SD interval 128 ms. QRS duration 88 ms. QT/QTc 352/449 ms. P-R-T axes 56 43 29.     Imaging:  CT Chest (PE) with Contrast:  1.  Stable to slight interval decreased size of the right lung base mass. Enlarged lymph nodes in the mediastinum and hilar regions appear stable. No new lung lesions.  2.  No evidence of pulmonary embolism. Thoracic aorta is unremarkable.  3.  Pancreatic tail mass and liver metastases described on prior abdomen/pelvis CT are only partially imaged on this exam.  As per radiology.     Laboratory:  CBC: WBC 17.0 (H), HGB 12.8 (L),   CMP: CO2 34 (H), Anion gap 34 (H), Glucose 142 (H), Albumin 2.5 (L) Alkaline phosphatase 391 (H).  (H),   o/w WNL (Creatinine 0.70)   Lactic Acid Whole Blood (1116): 1.7   N-Terminal Pro BNP: 159  Troponin I (1116): <0.015  Symptomatic SARS-CoV-2 COVID-19 Virus (Coronavirus) by PCR: Negative  Blood Culture: Pending  x2    Emergency Department Course:  Reviewed:  I reviewed nursing notes, vitals, past history and care everywhere    Assessments:  1122 I obtained history and examined the patient as noted above.   1330 I rechecked the patient and explained findings. The patient agreed to admission at this time.     Consults:   1339 I consulted with Dr. Eaton, hospitalist, regarding the history and presentation of the patient.     Interventions:  1202 NS 1L IV  1329 Rocephin 2g IV  1413 Azithroycim 500 mg IV    Disposition:  The patient was admitted to the hospital under the care of Dr. Eaton.    Impression & Plan      Medical Decision Making:  Jesus Alberto Porras is a 75-year-old man who is afebrile but hypoxic and tachycardic.  I reviewed his recent work-up with new metastatic cancer diagnosis.  His work-up here is noted for leukocytosis.  He continues to be hypoxic and required supplemental oxygen.  CT scan of the chest with PE protocol demonstrated no PE decreased interval size of presumed lung mass.  We discussed potential underlying pneumonia versus bronchitis.  He was given antibiotics.  Given his new oxygen requirement, I discussed my recommendation for admission to the hospital and he is in agreement.  I spoke with the hospitalist service who accepts him to the medical floor and he was in stable condition at time of admission.    Covid-19  Jesus Alberto Porras was evaluated during a global COVID-19 pandemic, which necessitated consideration that the patient might be at risk for infection with the SARS-CoV-2 virus that causes COVID-19.   Applicable protocols for evaluation were followed during the patient's care.   COVID-19 was considered as part of the patient's evaluation. The plan for testing is:  a test was obtained during this visit.    Diagnosis:    ICD-10-CM    1. Acute respiratory failure with hypoxia (H)  J96.01 Blood culture     Blood culture   2. Pneumonia due to infectious organism, unspecified laterality, unspecified  part of lung  J18.9        Scribe Disclosure:  I, Eleonora Hernandez, am serving as a scribe at 11:03 AM on 5/30/2021 to document services personally performed by Guanakito Stewart MD based on my observations and the provider's statements to me.     I, Marcos Heck, am serving as a scribe on 5/30/2021 at 2:32 PM to personally document services performed by Guanakito Stewart MD based on my observations and the provider's statements to me.       Guanakito Stewart MD  06/01/21 1252
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
## 2021-05-30 NOTE — ED NOTES
Pt started off on 2 Liters of oxygen prior to ambulating with out oxygen. Oxygen dropped once patient got back to his room to 84%. 1 Liter of oxygen is currently flowing throw meter. Sats are at 97%

## 2021-05-30 NOTE — PHARMACY-ADMISSION MEDICATION HISTORY
Admission medication history interview status for this patient is complete. See [facility] admission navigator for allergy information, prior to admission medications and immunization status.     Medication history interview done, indicate source(s): Patient  Medication history resources (including written lists, pill bottles, clinic record): SureScripts and Care Everywhere  Pharmacy: [facility]. [facility] for discharge    Changes made to PTA medication list:  Added: compazine, montelukast, ketorolac, dorzolamide/timolol, brimonidine, Azelastine  Deleted: cyclobenzaprine, Norco, Keppra (from 2015), Medrol dosepak, oxycodone, miralax, senna-docusate  Changed: none    Actions taken by pharmacist (provider contacted, etc): Spoke with patient about home med list.     Additional medication history information:None    Medication reconciliation/reorder completed by provider prior to medication history?  N   (Y/N)     Prior to Admission medications    Medication Sig Last Dose Taking? Auth Provider   azelastine (ASTELIN) 0.1 % nasal spray Spray 1 spray into both nostrils daily 5/30/2021 at am Yes Unknown, Entered By History   brimonidine (ALPHAGAN) 0.2 % ophthalmic solution Place 1 drop into the right eye 2 times daily 5/30/2021 at am Yes Unknown, Entered By History   cetirizine (ZYRTEC) 10 MG tablet Take 10 mg by mouth daily 5/30/2021 at am Yes Reported, Patient   clonazePAM (KLONOPIN) 0.5 MG tablet Take 0.5 mg by mouth 3 times daily  5/30/2021 at am Yes Reported, Patient   dorzolamide-timolol (COSOPT) 2-0.5 % ophthalmic solution Place 1 drop into the right eye 2 times daily 5/30/2021 at am Yes Unknown, Entered By History   ketorolac (ACULAR) 0.5 % ophthalmic solution Place 1 drop into the right eye 2 times daily 5/30/2021 at am Yes Unknown, Entered By History   montelukast (SINGULAIR) 10 MG tablet Take 10 mg by mouth At Bedtime 5/29/2021 at pm Yes Unknown, Entered By History   prochlorperazine (COMPAZINE) 10 MG  tablet Take 10 mg by mouth every 6 hours as needed for nausea or vomiting   Unknown, Entered By History

## 2021-05-31 NOTE — PLAN OF CARE
To Do:  End of Shift Summary  For vital signs and complete assessments, please see documentation flowsheets.     Pertinent assessments: VSS, denies pain or nausea. O2 1L NC, SOB on exertion. Up SBA. No stool this shift.  Major Shift Events: uneventful  Treatment Plan: IVF, Rocephin and Azithro po.   Bedside Nurse: Shazia Byrd RN

## 2021-05-31 NOTE — PROGRESS NOTES
"[facility]    Hospitalist Progress Note      Assessment & Plan   Jesus Alberto Porras is a 75 year old male who was admitted on 5/30/2021.    Summary of Stay:   Jesus Alberto Porras is a 75 year old male who presents with shortness of breath and cough.     This is a 75-year-old who has a recent diagnosis of metastatic neuroendocrine malignancy, diagnosed on liver biopsy on 5/13/2021.  He follows up with Dr. Ursula Marcus from [facility].  But he has not been started on chemotherapy yet.     He presented to the emergency room with 2 to 3-day history of progressively worsening cough and shortness of breath.  According to the patient, it started with what he thought was his seasonal allergies in form of runny nose and \"tickle in the throat\".  But since yesterday, he has been coughing a lot.  He has also been experiencing shortness of breath.  No fever or chills.  No chest pain.  No abdominal symptoms.     He has not received COVID-19 vaccine.  His girlfriend who lives with him is vaccinated.  Denies coming in contact with anyone with COVID-19.  COVID-19 test was negative in the ER.     In the ER, noted to have significant leukocytosis.  At rest, his oxygen saturation was around 90% but with activity he was becoming hypoxic.  So he is requiring oxygen.  He is being admitted to internal medicine service for a possible community-acquired pneumonia.     CT chest was negative for PE.    Plan:    Community-acquired pneumonia.  Acute hypoxic respiratory failure.  -IV ceftriaxone and azithromycin.  -Oxygen support.  Currently oxygen saturation is at borderline at rest with 90% on room air.  It drops down with activity.  -Continue antibiotic treatment.  White cell count is still elevated although better.  -CT chest was negative for PE.  But there is a possibility of right lower lobe pneumonia.  There is a right lower lobe infiltrate seen on the CT which was seen on a previous imaging as well.  No large " effusion seen.  No pulmonary edema.  -BNP was negative.  -On exam, no wheezing.  Per patient, he quit smoking about 30 years ago.  As far as he knows, no history of COPD or asthma.     Elevated LFTs.  Elevated alk phos and AST.  -Possibly related to liver neuroendocrine tumor.     Malignant neuroendocrine tumor.  -Thought to be metastatic in nature.  Unclear what the primary is, possibly pancreatic.  -Follow-up with [facility], Dr. Marcus.  He has an appointment to see her tomorrow.  He would like to go home first thing in the morning so he can attend the appointment.      DVT Prophylaxis: Enoxaparin (Lovenox) SQ  Code Status: Full Code  Expected discharge: 1 days    Deangelo Eaton MD  [employer] (7am - 6pm, M-F)    Interval History   Patient was evaluated with nursing staff. Overnight issues discussed.    Review of systems:  No nausea or vomiting.  No abdominal pain.  No diarrhea.  No chest pain/palpitations.  No headache/visual disturbance/new weakness.  Continues to have the cough.  Subjectively he feels that his breathing may be slightly better.    -Data reviewed today: Labs and medications.    Physical Exam   Temp: 97.4  F (36.3  C) Temp src: Oral BP: (!) 148/89 Pulse: 92   Resp: 24 SpO2: 93 % O2 Device: None (Room air) Oxygen Delivery: 1 LPM  Vitals:    05/30/21 1530   Weight: 62.2 kg (137 lb 3.2 oz)     Vital Signs with Ranges  Temp:  [96.8  F (36  C)-97.6  F (36.4  C)] 97.4  F (36.3  C)  Pulse:  [80-96] 92  Resp:  [18-24] 24  BP: (124-148)/(68-93) 148/89  SpO2:  [89 %-98 %] 93 %  I/O last 3 completed shifts:  In: 1558.33 [P.O.:240; I.V.:1318.33]  Out: -     Constitutional: Awake, alert, cooperative, no apparent distress  HEENT: Trachea midline, sclera is clear   Respiratory: Decreased air entry at the bases.  No wheezing.  Minimal crackles.  Slightly tachypneic.  Cardiovascular: Regular rate and rhythm, normal S1 and S2, and no murmur noted  GI: Normal bowel sounds, soft, non-distended,  non-tender  Extremities: No pitting edema     Medications       azithromycin  250 mg Oral Daily     [START ON 6/1/2021] cefTRIAXone  1 g Intravenous Daily     enoxaparin ANTICOAGULANT  40 mg Subcutaneous Daily     sodium chloride (PF)  3 mL Intracatheter Q8H       Data   Recent Labs   Lab 05/31/21  0645 05/30/21  1116   WBC 15.8* 17.0*   HGB 11.3* 12.8*   MCV 92 92    411    134   POTASSIUM 4.1 4.3   CHLORIDE 99 98   CO2 31 34*   BUN 6* 9   CR 0.53* 0.70   ANIONGAP 4 2*   TOMMY 8.6 8.6   * 142*   ALBUMIN  --  2.5*   PROTTOTAL  --  7.6   BILITOTAL  --  0.7   ALKPHOS  --  391*   ALT  --  58   AST  --  114*   TROPI  --  <0.015       No results found for this or any previous visit (from the past 24 hour(s)).

## 2021-06-01 NOTE — PROGRESS NOTES
Oncology/Hematology Follow Up Note:    Assessment and Plan:  Jesus Alberto Porras is a 75 year old male patient admitted 5/31 with acute respiratory failure.    1.  Patient has high-grade neuroendocrine cancer with pancreatic mass, lymphadenopathy, numerous liver lesions, right middle lobe mass and chest lymphadenopathy  -High-grade neuroendocrine in a non-smoker with primary of pancreas versus lung  -Previously discussed with patient best supportive care versus consideration of chemotherapy with carboplatin and etoposide versus consideration of chemotherapy with immunotherapy  -Patient and significant other had made no decisions and he was not certain he would do any chemotherapy.  -He was aware that with aggressive cancer his life expectancy is 3 to 6 months  -Brain MRI negative    PLAN:  Long discussion with patient and his significant other and his condition has declined even since the past 10 days.  We discussed best supportive care versus chemo.  He is not in a condition to tolerate chemo right now.     -- We discussed hospice and palliative care and consult will be placed accordingly      2.  Hypoxic respiratory failure with concern for possible pneumonia  -Negative for PE  -On antibiotics  -On oxygen  -Chest CT personally reviewed and agree with assessment shows stable right lung base mass, enlarged lymph nodes in the mediastinum and hilar region appears stable.      3. FULL CODE    D/w Dr. Eaton and appreciate his care.    Subjective:    Feels better but is winded easily, short of breath on minimal exertion- Significant other concerned about his nutrition also.    Scheduled Medications:  Reviewed active medications    Labs:  CBC RESULTS:   Recent Labs   Lab Test 06/01/21  0740 05/31/21  0645 05/30/21  1116   WBC 16.7* 15.8* 17.0*   HGB 12.0* 11.3* 12.8*   HCT 37.9* 36.4* 41.3   MCV 90 92 92    352 411       CMP  Recent Labs   Lab 05/31/21  0645 05/30/21  1116    134   POTASSIUM 4.1 4.3   CHLORIDE  "99 98   CO2 31 34*   ANIONGAP 4 2*   * 142*   BUN 6* 9   CR 0.53* 0.70   GFRESTIMATED >90 >90   GFRESTBLACK >90 >90   TOMMY 8.6 8.6   PROTTOTAL  --  7.6   ALBUMIN  --  2.5*   BILITOTAL  --  0.7   ALKPHOS  --  391*   AST  --  114*   ALT  --  58       INRNo lab results found in last 7 days.    Objective/Physical Exam:  Blood pressure (!) 146/100, pulse 93, temperature 98.1  F (36.7  C), temperature source Oral, resp. rate 22, height 1.74 m (5' 8.5\"), weight 62.2 kg (137 lb 3.2 oz), SpO2 93 %.  General appearance: a bit short of breath, weight loss  HEENT:sclera anicteric, no pallor, no thrush or mucositis.  Lungs: chest is clear to auscultation, no rales or rhonchi appreciated. Coarse BS, also not much air entry  Abdomen: soft, NT, ND , BS normal  Ext: no edema or rashes    Ursula Marcus MD  [facility]  6/1/2021 9:59 AM  Time: 35 minutes with patient , 30 minutes in counseling and coordination of care    "

## 2021-06-01 NOTE — PROGRESS NOTES
Patient has been assessed for Home Oxygen needs.  Oxygen readings:   *   RA - at rest  Pulse oximetry SPO2 90 %  *   RA - during activity/with exercise SPO2 87 %  *   O2 at  2 liters/minute (at rest) ...SPO2 93 %  *   O2 at  2liters/minute (during activity/with exercise) ...SPO2 90 %

## 2021-06-01 NOTE — CONSULTS
[facility]  Palliative Care Consultation   Text Page    Assessment & Plan   Jesus Alberto Porras is a 75 year old male who was admitted on 5/30/2021.   Consulted by Dr. Ursula Marcus to assist with goals of care conversation    Recommendations:  1. Goals of Care- No CPR- Do NOT Intubate  Hospitalization goals discussed patient and significant other Fanny Renae.  Verbalized desire to learn more about hospice benefit at time of discharge.  Stated he would not like chemotherapy.  Would not like to return to the hospital.  Would not like heroic measures.  Decisional Capacity- Intact. Patient does not have an advance directive. Per  informed consent policy next of kin should be involved if patient becomes unable.  Next of kin includes patient's sister and brother.  POLST none on file, will complete tomorrow with patient and S/O    - goals of care discussion   - review purpose of health care directive, designation of health care agent.    3. Dyspnea  - supplemental oxygen  - continue with treatment of underlying source  - offer IS, HOB elevated    4. Malnutrition  - supplements with Boost Plus in between meals.  - recommend dietician consult to offer nutritional planning to patient and S/O    5. Spiritual Care  Spiritual Health Services declined at this time.  Spiritual Background: none identified.    6. Care Planning  Appreciate Care of multidisciplinary team.  patient deciding to discharge home with home care vs hospice enrollment. plan pending.    Medical Decision Making and Goals of Care:  Discussed on June 1, 2021 with Lauren IYER, CNP: Met with Mark and his significant other Fanny, at the bedside.  They inquired about planning for discharge and options for care.  Fanny inquired about the hospice benefit and Mark asked if we could talk with them about it.  I reviewed and educated on the hospice benefit and goal for comfort and maintenance of quality of life.  They verbalized  understanding. Mark stated he thinks that sounds like the best plan.  Stated he would not want to come back into the hospital if he didn't need to.  Stated he would want to be at his home.  Fanny stated that she could be his care assistant if or when he needs it at home, or they could see what other services they would need as time passes.    Inquired about the plan in the hospital, Mark stated he wants to recover from the pneumonia and get home.  He stated he wants to discharge tomorrow.  I inquired about plan for further interventions, he stated he did not want anything more done. He stated that he could not get chemotherapy at this time and so his 'hands were tied.'  He stated that it would not be what he would want in any event if it led to more symptoms or return to the hospital.    Reviewed code status as it relates to goals of care.  Asked if he would want efforts to restart his heart or a breathing tube placed if he were to stop breathing.  He stated he would not.  He stated he would want things to be as natural as possible for him at end of life.  I reviewed the process of changing the code status order today and the POLST form with him, he stated he wants to have the POLST form for discharge.    They denied further questions.  Verbalized appreciation for the information.  Plan for follow up tomorrow.    Thank you for involving us in the patient's care.     Lauren IYER, CNP  Pain Management and Palliative Care  [facility]  Pgr: [phone]    Time Spent on this Encounter   Total unit/floor time 754 minutes, time consisted of the following, examination of the patient, reviewing the record and completing documentation. >50% of time spent in counseling and coordination of care, Bedside Nurse Elidia Hallman and Hospitalist Dr. Eaton.  Time spend counseling with patient and significant other consisted of the following topics, goals of care, advance care planning, care  planning for discharge and symptom management.    Understanding of disease process:   This has been discussed with patient and significant other.    History of Present Illness   History is obtained from the patient and electronic health record    Jesus Alberto Porras is a 75 year old male without significant past medical history, recently diagnosed with metastatic neuroendocrine malignancy (5/13).  He was following with Dr. Marcus and he has not started chemotherapy to date.  He presented with recent onset of cough and shortness of breath.  Work up revealed likely CAP.  COVID negative.    This is in the setting of recent diagnosis of metastatic disease and ongoing dyspnea with minimal exertion.  He has been hospitalized 2 times in the past 1 1/2 months for abdominal pain and neck pain. There has been a decline in daily function including increased fatigue and inability to participate in all IADLs.  There is a stated unitentional weight loss..      Past Medical History   I have reviewed this patient's medical history and updated it with pertinent information if needed.   Past Medical History:   Diagnosis Date     CSF leak from ear      Gastro-oesophageal reflux disease      Seizures (H)      Sexual dysfunction        Past Surgical History   I have reviewed this patient's surgical history and updated it with pertinent information if needed.  Past Surgical History:   Procedure Laterality Date     CHOLECYSTECTOMY       CRANIOTOMY Left 2/16/2015    Procedure: CRANIOTOMY;  Surgeon: Tobias Martínez MD;  Location: [facility] OR     EYE SURGERY       INSERT DRAIN LUMBAR N/A 2/16/2015    Procedure: INSERT DRAIN LUMBAR;  Surgeon: Tobias Martínez MD;  Location: [facility] OR       Prior to Admission Medications   Prior to Admission Medications   Prescriptions Last Dose Informant Patient Reported? Taking?   azelastine (ASTELIN) 0.1 % nasal spray 5/30/2021 at am  Yes Yes   Sig: Spray 1 spray into both nostrils daily   brimonidine  (ALPHAGAN) 0.2 % ophthalmic solution 5/30/2021 at am  Yes Yes   Sig: Place 1 drop into the right eye 2 times daily   cetirizine (ZYRTEC) 10 MG tablet 5/30/2021 at am  Yes Yes   Sig: Take 10 mg by mouth daily   clonazePAM (KLONOPIN) 0.5 MG tablet 5/30/2021 at am  Yes Yes   Sig: Take 0.5 mg by mouth 3 times daily    dorzolamide-timolol (COSOPT) 2-0.5 % ophthalmic solution 5/30/2021 at am  Yes Yes   Sig: Place 1 drop into the right eye 2 times daily   ketorolac (ACULAR) 0.5 % ophthalmic solution 5/30/2021 at am  Yes Yes   Sig: Place 1 drop into the right eye 2 times daily   montelukast (SINGULAIR) 10 MG tablet 5/29/2021 at pm  Yes Yes   Sig: Take 10 mg by mouth At Bedtime   prochlorperazine (COMPAZINE) 10 MG tablet   Yes No   Sig: Take 10 mg by mouth every 6 hours as needed for nausea or vomiting      Facility-Administered Medications: None     Allergies   Allergies   Allergen Reactions     Dilantin [Phenytoin] Rash     Amoxicillin Rash     Carbamazepine Rash       Social History   I have updated and reviewed the following Social History Narrative:   Social History     Social History Narrative     Not on file           Living situation: home with significant other.       Support system: significant other, two siblings       Actual/Potential Caregiver: significant other       Functional status: independent at baseline.    Family History   I have reviewed this patient's family history and updated it with pertinent information if needed.   No family history on file.    Review of Systems   The 10 point Review of Systems is negative other than noted in the HPI or here.     Physical Exam   Temp:  [97.5  F (36.4  C)-98.1  F (36.7  C)] 97.8  F (36.6  C)  Pulse:  [82-99] 94  Resp:  [18-22] 18  BP: (129-175)/() 147/98  SpO2:  [92 %-94 %] 93 %  137 lbs 3.2 oz  Exam:  GEN:  Alert, oriented x 3, NAD.  HEENT:  Normocephalic/atraumatic, no scleral icterus, no nasal discharge, mouth moist.  CV:  RRR, S1, S2; no murmurs or other  irregularities noted.  +3 DP/PT pulses bilatererally; no edema BLE.  RESP:  diminished to auscultation bilaterally without rales/rhonchi/wheezing/retractions.  Symmetric chest rise on inhalation noted.   ABD:  Rounded, firm, non-tender.  +BS  EXT:  Edema & pulses as noted above.  CMS intact x 4.      SKIN:  Dry to touch, no exanthems noted in the visualized areas.    NEURO: Symmetric strength +5/5.  Sensation to touch intact all extremities.   There is no area of allodynia or hyperesthesia.  PAIN BEHAVIOR: Cooperative  Psych:  Normal affect.  Calm, cooperative, conversant appropriately.    Data   Results for orders placed or performed during the hospital encounter of 05/30/21 (from the past 24 hour(s))   CBC with platelets   Result Value Ref Range    WBC 16.7 (H) 4.0 - 11.0 10e9/L    RBC Count 4.21 (L) 4.4 - 5.9 10e12/L    Hemoglobin 12.0 (L) 13.3 - 17.7 g/dL    Hematocrit 37.9 (L) 40.0 - 53.0 %    MCV 90 78 - 100 fl    MCH 28.5 26.5 - 33.0 pg    MCHC 31.7 31.5 - 36.5 g/dL    RDW 14.5 10.0 - 15.0 %    Platelet Count 381 150 - 450 10e9/L   XR Chest 2 Views    Narrative    CHEST TWO VIEWS June 1, 2021 9:29 AM     HISTORY: Pneumonia follow-up.    COMPARISON: Chest x-ray 4/25/2006. CT chest 5/30/2021.      Impression    IMPRESSION: PA and lateral views of the chest. Lungs are clear. New  elevation right hemidiaphragm. Heart is normal in size. No effusions  are evident. No pneumothorax.    KATHY CONTRERAS MD   NT proBNP inpatient and ED   Result Value Ref Range    N-Terminal Pro BNP Inpatient 644 0 - 1,800 pg/mL   Comprehensive metabolic panel   Result Value Ref Range    Sodium 128 (L) 133 - 144 mmol/L    Potassium 3.7 3.4 - 5.3 mmol/L    Chloride 91 (L) 94 - 109 mmol/L    Carbon Dioxide 32 20 - 32 mmol/L    Anion Gap 5 3 - 14 mmol/L    Glucose 121 (H) 70 - 99 mg/dL    Urea Nitrogen 9 7 - 30 mg/dL    Creatinine 0.44 (L) 0.66 - 1.25 mg/dL    GFR Estimate >90 >60 mL/min/[1.73_m2]    GFR Estimate If Black >90 >60  mL/min/[1.73_m2]    Calcium 8.5 8.5 - 10.1 mg/dL    Bilirubin Total 0.6 0.2 - 1.3 mg/dL    Albumin 2.3 (L) 3.4 - 5.0 g/dL    Protein Total 7.3 6.8 - 8.8 g/dL    Alkaline Phosphatase 357 (H) 40 - 150 U/L     (H) 0 - 70 U/L     (H) 0 - 45 U/L

## 2021-06-01 NOTE — PROGRESS NOTES
"[facility]    Hospitalist Progress Note      Assessment & Plan   Jesus Alberto Porras is a 75 year old male who was admitted on 5/30/2021.    Summary of Stay:   Jesus Alberto Porras is a 75 year old male who presents with shortness of breath and cough.     This is a 75-year-old who has a recent diagnosis of metastatic neuroendocrine malignancy, diagnosed on liver biopsy on 5/13/2021.  He follows up with Dr. Ursula Marcus from [facility].  But he has not been started on chemotherapy yet.     He presented to the emergency room with 2 to 3-day history of progressively worsening cough and shortness of breath.  According to the patient, it started with what he thought was his seasonal allergies in form of runny nose and \"tickle in the throat\".  But since yesterday, he has been coughing a lot.  He has also been experiencing shortness of breath.  No fever or chills.  No chest pain.  No abdominal symptoms.     He has not received COVID-19 vaccine.  His girlfriend who lives with him is vaccinated.  Denies coming in contact with anyone with COVID-19.  COVID-19 test was negative in the ER.     In the ER, noted to have significant leukocytosis.  At rest, his oxygen saturation was around 90% but with activity he was becoming hypoxic.  So he is requiring oxygen.  He is being admitted to internal medicine service for a possible community-acquired pneumonia.     CT chest was negative for PE.    Plan:    Community-acquired pneumonia.  Acute hypoxic respiratory failure.  -IV ceftriaxone and azithromycin.  -Continues to require oxygen.  This morning when I saw him, he was on 2 L of oxygen by nasal cannula saturating around 93%.  -Also appears tachypneic with mild supraclavicular retracting.  But the patient tells me that this is how his breathing always is and it is related to his underlying anxiety.  -Continues to have persistent leukocytosis.  -Has not received any steroid.  -Patient is really eager to go home " because he feels much better.  But given all the above findings, recommended to stay for ongoing treatment in the hospital.  -Continue oxygen monitoring and try to wean down the oxygen.  -No wheezing on exam.  He quit smoking about 30 years ago.  Although low likelihood of COPD, may try albuterol to see if that would improve his oxygenation.  -Repeat chest x-ray.  Recheck labs including LFTs and BNP.  -BNP at admission was low.  On exam does not appear to have JVD     Elevated LFTs.  Elevated alk phos and AST.  -Possibly related to neuroendocrine tumor with metastasis to the liver     Malignant neuroendocrine tumor.  -Thought to be metastatic in nature.  Unclear what the primary is, possibly pancreatic.  -Follow-up with [facility], Dr. Marcus.         DVT Prophylaxis: Enoxaparin (Lovenox) SQ  Code Status: Full Code  Expected discharge:  Possibly tomorrow.    Deangelo Eaton MD  Text Page (7am - 6pm, M-F)    Interval History   Patient was evaluated with nursing staff. Overnight issues discussed.    Review of systems:  No nausea or vomiting.  No abdominal pain.  No diarrhea.  No chest pain/palpitations.  No new cough/shortness of breath.  No headache/visual disturbance/new weakness.    -Data reviewed today: Labs and medications.    Physical Exam   Temp: 97.8  F (36.6  C) Temp src: Oral BP: (!) 147/98 Pulse: 94   Resp: 18 SpO2: 93 % O2 Device: Nasal cannula Oxygen Delivery: 2 LPM  Vitals:    05/30/21 1530   Weight: 62.2 kg (137 lb 3.2 oz)     Vital Signs with Ranges  Temp:  [97.5  F (36.4  C)-98.1  F (36.7  C)] 97.8  F (36.6  C)  Pulse:  [82-99] 94  Resp:  [18-22] 18  BP: (129-175)/() 147/98  SpO2:  [92 %-94 %] 93 %  I/O last 3 completed shifts:  In: 2148 [P.O.:960; I.V.:1188]  Out: -     Constitutional: Awake, alert, cooperative, no apparent distress  HEENT: Trachea midline, sclera is clear   Respiratory: No crackles. No wheezing.  Decreased air entry at the bases, especially on the right  side.  Cardiovascular: Regular rate and rhythm, normal S1 and S2, and no murmur noted.  Does not appear to have JVD.  No peripheral edema.  GI: Normal bowel sounds, soft, non-distended, non-tender  Extremities: No pitting edema     Medications       azithromycin  250 mg Oral Daily     cefTRIAXone  1 g Intravenous Daily     enoxaparin ANTICOAGULANT  40 mg Subcutaneous Daily     sodium chloride (PF)  3 mL Intracatheter Q8H       Data   Recent Labs   Lab 06/01/21  1025 06/01/21  0740 05/31/21  0645 05/30/21  1116   WBC  --  16.7* 15.8* 17.0*   HGB  --  12.0* 11.3* 12.8*   MCV  --  90 92 92   PLT  --  381 352 411   *  --  134 134   POTASSIUM 3.7  --  4.1 4.3   CHLORIDE 91*  --  99 98   CO2 32  --  31 34*   BUN 9  --  6* 9   CR 0.44*  --  0.53* 0.70   ANIONGAP 5  --  4 2*   TOMMY 8.5  --  8.6 8.6   *  --  100* 142*   ALBUMIN 2.3*  --   --  2.5*   PROTTOTAL 7.3  --   --  7.6   BILITOTAL 0.6  --   --  0.7   ALKPHOS 357*  --   --  391*   *  --   --  58   *  --   --  114*   TROPI  --   --   --  <0.015       Recent Results (from the past 24 hour(s))   XR Chest 2 Views    Narrative    CHEST TWO VIEWS June 1, 2021 9:29 AM     HISTORY: Pneumonia follow-up.    COMPARISON: Chest x-ray 4/25/2006. CT chest 5/30/2021.      Impression    IMPRESSION: PA and lateral views of the chest. Lungs are clear. New  elevation right hemidiaphragm. Heart is normal in size. No effusions  are evident. No pneumothorax.    KATHY CONTRERAS MD

## 2021-06-01 NOTE — PLAN OF CARE
For vital signs and complete assessments, please see documentation flowsheets.     Pertinent assessments: A&Ox4. Denies pain. Tolerating regular diet. On 1 L of oxygen overnight with continuous pulse ox monitoring.   Major Shift Events: Uneventful.  Treatment Plan: Rocephin, Zithromax. Plan to discharge this morning. Follow up with [facility].  Bedside Nurse: Emmy Ambrosio RN

## 2021-06-01 NOTE — PLAN OF CARE
Pertinent assessments: Pt up SBA. A&Ox4. VSS, BP slightly elevated. On 1L. Sats low 90s on RA @ rest, high 80s% with activity. Rocephin & Azithromycin for Abx. Tolerating diet  Treatment Plan: IVF, Rocephin and Azithro PO.

## 2021-06-02 NOTE — PHARMACY-VANCOMYCIN DOSING SERVICE
Pharmacy Vancomycin Initial Note  Date of Service 2021  Patient's  1945  75 year old, male    Indication: Community Acquired Pneumonia    Current estimated CrCl = Estimated Creatinine Clearance: 115.8 mL/min (A) (based on SCr of 0.47 mg/dL (L)).    Creatinine for last 3 days  2021:  6:45 AM Creatinine 0.53 mg/dL  2021: 10:25 AM Creatinine 0.44 mg/dL  2021:  4:55 AM Creatinine 0.47 mg/dL    Recent Vancomycin Level(s) for last 3 days  No results found for requested labs within last 72 hours.      Vancomycin IV Administrations (past 72 hours)      No vancomycin orders with administrations in past 72 hours.                Nephrotoxins and other renal medications (From now, onward)    Start     Dose/Rate Route Frequency Ordered Stop    21 1800  vancomycin (VANCOCIN) 1000 mg in dextrose 5% 200 mL PREMIX      1,000 mg  200 mL/hr over 1 Hours Intravenous EVERY 12 HOURS 21 1741            Contrast Orders - past 72 hours (72h ago, onward)    None              Plan:  1. Start vancomycin  1000 mg IV q12h.   2. Vancomycin therapeutic monitoring goal: 10-15 mg/L  3. Pharmacy will check vancomycin levels as appropriate in 1-3 Days.    4. Serum creatinine levels will be ordered a minimum of twice weekly.      Jt Chavez [facility]

## 2021-06-02 NOTE — PLAN OF CARE
End of Shift Summary  For vital signs and complete assessments, please see documentation flowsheets.     Pertinent assessments: VSS.  A&O.  Denies pain and nausea.  Reports some SOB, tachypneic at times.  Lungs diminished, shallow. On 2 L oxygen, sats low 90's. Desats with activity into the mid 80's with O2 on.  Poor appetite. Stand by assist with ambulation.     Major Shift Events: triggered sepsis - lactic-2.1.  MD notified, see note.     Treatment Plan: Rocephin, Zithromax. Oxygen, wean as able.     Bedside Nurse: Magy Chaparro RN

## 2021-06-02 NOTE — PROGRESS NOTES
I certify that this patient, Jesus Alberto Porras has been under my care (or a nurse practitioner or physican's assistant working with me). This is the face-to-face encounter for oxygen medical necessity.      Jesus Alberto Porras is now in a chronic stable state and continues to require supplemental oxygen. 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  If portability is ordered, is the patient mobile within the home? yes    **Patients who qualify for home O2 coverage under the CMS guidelines require ABG tests or O2 sat readings obtained closest to, but no earlier than 2 days prior to the discharge, as evidence of the need for home oxygen therapy. Testing must be performed while patient is in the chronic stable state. See notes for O2 sats.**

## 2021-06-02 NOTE — PROGRESS NOTES
Oncology/Hematology Follow Up Note:    Assessment and Plan:  Jesus Alberto Porras is a 75 year old male patient admitted 5/31 with acute respiratory failure.     1.  Patient has high-grade neuroendocrine cancer with pancreatic mass, lymphadenopathy, numerous liver lesions, right middle lobe mass and chest lymphadenopathy  -High-grade neuroendocrine in a non-smoker with primary of pancreas versus lung  -Previously discussed with patient best supportive care versus consideration of chemotherapy with carboplatin and etoposide versus consideration of chemotherapy with immunotherapy  -Patient and significant other had made no decisions and he was not certain he would do any chemotherapy.  -He was aware that with aggressive cancer his life expectancy is 3 to 6 months  -Brain MRI negative     PLAN:  Long discussion with patient and his significant other Angela and his condition has declined even since the past 10 days.  We discussed best supportive care versus chemo.  He is not in a condition to tolerate chemo right now.      -- We discussed hospice and palliative care and consult will be placed accordingly, Appreciate the collaboration, patient is not certain he will sign onto hospice yet but agrees that he does not want chemotherapy.  -- Appreciate palliative care.        2.  Hypoxic respiratory failure with concern for possible pneumonia  -Negative for PE  -On antibiotics  -On oxygen  -Chest CT personally reviewed and agree with assessment shows stable right lung base mass, enlarged lymph nodes in the mediastinum and hilar region appears stable.        3. DNR/DNI     D/w Dr. Eaton and appreciate his care.  Subjective:    Anxious to go home does not want chemotherapy, feels his breathing is better.  Realizes he may have to go home on oxygen      Labs:  All labs reviewed    CBC  Recent Labs   Lab 06/02/21  0455 06/01/21  0740 05/31/21  0645   WBC 25.3* 16.7* 15.8*   HGB 11.8* 12.0* 11.3*   MCV 90 90 92    381 352        CMP  Recent Labs   Lab 06/02/21  0455 06/01/21  1025 05/31/21  0645 05/30/21  1116   * 128* 134 134   POTASSIUM 3.6 3.7 4.1 4.3   CHLORIDE 89* 91* 99 98   CO2 31 32 31 34*   ANIONGAP 6 5 4 2*   * 121* 100* 142*   BUN 11 9 6* 9   CR 0.47* 0.44* 0.53* 0.70   GFRESTIMATED >90 >90 >90 >90   GFRESTBLACK >90 >90 >90 >90   TOMMY 8.2* 8.5 8.6 8.6   PROTTOTAL 7.4 7.3  --  7.6   ALBUMIN 2.4* 2.3*  --  2.5*   BILITOTAL 1.3 0.6  --  0.7   ALKPHOS 423* 357*  --  391*   * 499*  --  114*   * 110*  --  58       INR  Recent Labs   Lab 06/02/21  0455   INR 1.34*       Blood Culture  Recent Labs   Lab 05/30/21  1329 05/30/21  1315   CULT No growth after 3 days No growth after 3 days         Ursula Marcus MD  [facility]  6/2/2021 12:57 PM

## 2021-06-02 NOTE — PLAN OF CARE
End of Shift Summary  For vital signs and complete assessments, please see documentation flowsheets.     Pertinent assessments: VSS.  A&Ox4. Denies pain. Reports some SOB, tachypneic at times with abdominal muscle use.  Lungs diminished, shallow. On 2 L oxygen, sats low 90's. Desats with activity. Poor appetite. Stand by assist with ambulation.   Major Shift Events:   Treatment Plan: Rocephin, Zithromax. Oxygen, wean as able.   Bedside Nurse: Jenae Guerrero RN

## 2021-06-02 NOTE — PROGRESS NOTES
[facility]  Palliative Care Progress Note  Text Page     Assessment & Plan   Recommendations:  1. Goals of Care- No CPR- Do NOT Intubate  Hospitalization goals discussed patient and significant other Fanny Renae.  Verbalized desire to learn more about hospice benefit at time of discharge.  Stated he would not like chemotherapy.  Would not like to return to the hospital.  Would not like heroic measures.  Decisional Capacity- Intact. Patient does not have an advance directive. Per  informed consent policy next of kin should be involved if patient becomes unable.  Next of kin includes patient's sister and brother.    POLST completed today indicating No CPR - No intubation and comfort focused care.     - coordination of care to discharge on hospice benefit.     3. Dyspnea  - supplemental oxygen  - continue with treatment of underlying source  - offer IS, HOB elevated     4. Malnutrition  - supplements with Boost Plus in between meals.  - recommend dietician consult to offer nutritional planning to patient and S/O     5. Spiritual Care  Spiritual Health Services declined at this time.  Spiritual Background: none identified.     6. Care Planning  Appreciate Care of multidisciplinary team.    - coordination for hospice discharge pending.     Medical Decision Making and Goals of Care:  Discussed on June 2, 2021 with Lauren Adkins APRN, CNP:   Met with Mark and Fanny at the bedside.  Reviewed their understanding of the plan of care.  Mark is frustrated and states he is going home today, appears restless and not happy.  Fanny states he had increased WBC and so she thinks the doctors recommend that he stay in the hospital.    I reviewed options for continuing care in the hospital or discharging home on hospice in the coming days.  Mark stated he wanted to go home.  I reviewed hospice services and offered that hospice would be the recommended resource to provide him with at discharge in  order to facilitate a safe plan.  He verbalized agreement and understanding.  Reviewed POLST form, Mark agreed that he would not want CPR or intubation.  Agreed that he wants to focus on comfort measures and that he would not want to come back to the hospital for management.   SW consult placed and Fanny and Mark were educated on SW role in discharge planning.  Fanny inquired about health care directive, short form provided and  consult placed for ACP facilitation.    Denied further questions.       Lauren Adkins APRN, CNP  Pain Management and Palliative Care  [facility]  Pgr: [phone]      Time Spent on this Encounter   Total unit/floor time 35 minutes, time consisted of the following, examination of the patient, reviewing the record and completing documentation. >50% of time spent in counseling and coordination of care, Bedside Nurse Lisbet Rodríguez and Hospitalist Dr. Deangelo Eaton.  Time spend counseling with patient and family consisted of the following topics, advance care planning and care planning for discharge.    Review of Systems    CONSTITUTIONAL: NEGATIVE for fever, chills, change in weight  ENT/MOUTH: NEGATIVE for ear, mouth and throat problems  RESP: NEGATIVE for significant cough or SOB  CV: NEGATIVE for chest pain, palpitations or peripheral edema    Physical Exam   Temp:  [97  F (36.1  C)-98.4  F (36.9  C)] 98.3  F (36.8  C)  Pulse:  [] 102  Resp:  [18-28] 22  BP: (131-149)/(81-95) 131/86  SpO2:  [90 %-96 %] 96 %  137 lbs 3.2 oz  Exam:  GEN:  Alert, oriented x 3, NAD.  HEENT:  Normocephalic/atraumatic, no scleral icterus, no nasal discharge, mouth moist.  CV:  RRR, S1, S2; no murmurs or other irregularities noted.  +3 DP/PT pulses bilatererally; no edema BLE.  RESP:  diminished to auscultation bilaterally without rales/rhonchi/wheezing/retractions.  Symmetric chest rise on inhalation noted.   ABD:  Rounded, firm, non-tender.  +BS  EXT:  Edema & pulses  as noted above.  CMS intact x 4.      SKIN:  Dry to touch, no exanthems noted in the visualized areas.    NEURO: Symmetric strength +5/5.  Sensation to touch intact all extremities.   There is no area of allodynia or hyperesthesia.  PAIN BEHAVIOR: Cooperative  Psych:  Normal affect.  Calm, cooperative, conversant appropriately.       Medications       azithromycin  250 mg Oral Daily     cefTRIAXone  1 g Intravenous Daily     cetirizine  10 mg Oral Daily     clonazePAM  0.5 mg Oral TID w/meals     enoxaparin ANTICOAGULANT  40 mg Subcutaneous Daily     sodium chloride (PF)  3 mL Intracatheter Q8H       Data   Results for orders placed or performed during the hospital encounter of 05/30/21 (from the past 24 hour(s))   Comprehensive metabolic panel   Result Value Ref Range    Sodium 126 (L) 133 - 144 mmol/L    Potassium 3.6 3.4 - 5.3 mmol/L    Chloride 89 (L) 94 - 109 mmol/L    Carbon Dioxide 31 20 - 32 mmol/L    Anion Gap 6 3 - 14 mmol/L    Glucose 133 (H) 70 - 99 mg/dL    Urea Nitrogen 11 7 - 30 mg/dL    Creatinine 0.47 (L) 0.66 - 1.25 mg/dL    GFR Estimate >90 >60 mL/min/[1.73_m2]    GFR Estimate If Black >90 >60 mL/min/[1.73_m2]    Calcium 8.2 (L) 8.5 - 10.1 mg/dL    Bilirubin Total 1.3 0.2 - 1.3 mg/dL    Albumin 2.4 (L) 3.4 - 5.0 g/dL    Protein Total 7.4 6.8 - 8.8 g/dL    Alkaline Phosphatase 423 (H) 40 - 150 U/L     (H) 0 - 70 U/L     (H) 0 - 45 U/L   CBC with platelets differential   Result Value Ref Range    WBC 25.3 (H) 4.0 - 11.0 10e9/L    RBC Count 4.04 (L) 4.4 - 5.9 10e12/L    Hemoglobin 11.8 (L) 13.3 - 17.7 g/dL    Hematocrit 36.4 (L) 40.0 - 53.0 %    MCV 90 78 - 100 fl    MCH 29.2 26.5 - 33.0 pg    MCHC 32.4 31.5 - 36.5 g/dL    RDW 14.6 10.0 - 15.0 %    Platelet Count 296 150 - 450 10e9/L    Diff Method Manual Differential     % Neutrophils 89.0 %    % Lymphocytes 5.0 %    % Monocytes 6.0 %    % Eosinophils 0.0 %    % Basophils 0.0 %    Absolute Neutrophil 22.5 (H) 1.6 - 8.3 10e9/L     Absolute Lymphocytes 1.3 0.8 - 5.3 10e9/L    Absolute Monocytes 1.5 (H) 0.0 - 1.3 10e9/L    Absolute Eosinophils 0.0 0.0 - 0.7 10e9/L    Absolute Basophils 0.0 0.0 - 0.2 10e9/L    RBC Morphology Consistent with reported results     Platelet Estimate       Automated count confirmed.  Platelet morphology is normal.   INR   Result Value Ref Range    INR 1.34 (H) 0.86 - 1.14   Lactic acid level STAT   Result Value Ref Range    Lactate for Sepsis Protocol 2.1 (H) 0.7 - 2.0 mmol/L

## 2021-06-02 NOTE — PLAN OF CARE
A&Ox4. Currently on 3L oxygen. Up Ax1 with gait belt, oxygen text done, ambulated in marroquin. Patient wanting to discharge home today. Dr. Eaton would like patient to stay as he is not medically ready to discharge. Plan is to sign out AMA. Dr. Eaton will order home oxygen and send patient with PO antibiotics. Writer spoke with Dr. Marcus on phone and updated her on patients plan, she said she will follow up with patient output. SO, Angela at bedside and updated.

## 2021-06-02 NOTE — PROGRESS NOTES
"[facility]    Hospitalist Progress Note      Assessment & Plan   Jesus Alberto Porras is a 75 year old male who was admitted on 5/30/2021.    Summary of Stay:   Jesus Alberto Porras is a 75 year old male who presents with shortness of breath and cough.     This is a 75-year-old who has a recent diagnosis of metastatic neuroendocrine malignancy, diagnosed on liver biopsy on 5/13/2021.  He follows up with Dr. Ursula Marcus from [facility].  But he has not been started on chemotherapy yet.     He presented to the emergency room with 2 to 3-day history of progressively worsening cough and shortness of breath.  According to the patient, it started with what he thought was his seasonal allergies in form of runny nose and \"tickle in the throat\".  But since yesterday, he has been coughing a lot.  He has also been experiencing shortness of breath.  No fever or chills.  No chest pain.  No abdominal symptoms.     He has not received COVID-19 vaccine.  His girlfriend who lives with him is vaccinated.  Denies coming in contact with anyone with COVID-19.  COVID-19 test was negative in the ER.     In the ER, noted to have significant leukocytosis.  At rest, his oxygen saturation was around 90% but with activity he was becoming hypoxic.  So he is requiring oxygen.  He is being admitted to internal medicine service for a possible community-acquired pneumonia.     CT chest was negative for PE.     Plan:    Community-acquired pneumonia.  Acute hypoxic respiratory failure.  -patient remains on IV ceftriaxone and azithromycin  -Despite that the leukocytosis is worsening.  Although clinically patient does not feel worse.  -Similar oxygen requirement.  Stable blood pressure.  -But there is a significant rise in the white cell count.  No abdominal symptoms.  Denies any diarrhea/nausea/vomiting.  -I spoke to the patient at length about these changes.  Discussed the possibility of broadening the antibiotic regime and " also potentially obtaining an ID consult.  -But the patient refused his options.  He wanted to go home.  I spoke to him at length explaining the severe outcomes of inadequately treated infection including death.  But the patient was adamant that he wanted to go home.  Girlfriend Fanny was also present in the room.  -After lengthy discussion, patient decided that he would leave today.  I agreed to give him prescription of antibiotics as well as arrange oxygen for him.  But I explained to him that it will be discharged AGAINST MEDICAL ADVICE and he verbalized the understanding.  -After all the discharge stuff was done, later on in the evening the nurse notified me that the patient decided to stay.  -Started on IV vancomycin.  -Repeat labs ordered.  -Right upper quadrant ultrasound ordered due to elevated LFTs.     Elevated LFTs.  Elevated alk phos and AST.  -Possibly related to neuroendocrine tumor with metastasis to the liver  -Patient denies any new abdominal pain, nausea or vomiting.  -Obtain right upper quadrant ultrasound as above.    Hyponatremia  --start NS. monitor     Malignant neuroendocrine tumor.  -Thought to be metastatic in nature.  Unclear what the primary is, possibly pancreatic.  -Follow-up with [facility], Dr. Marcus.  -Seen by palliative care team.  Patient does not want to consider chemotherapy at this point.  Wants to consider hospice care.    DVT Prophylaxis: Enoxaparin (Lovenox) SQ  Code Status: No CPR- Do NOT Intubate  Expected discharge: 1-2 days    Deangelo Eaton MD  Text Page (7am - 6pm, M-F)    Interval History   Patient was evaluated with nursing staff. Overnight issues discussed.    Review of systems:  No nausea or vomiting.  No abdominal pain.  No diarrhea.  No chest pain/palpitations.  No new cough/shortness of breath.  No headache/visual disturbance/new weakness.    -Data reviewed today: Labs and medications.    Physical Exam   Temp: 98.3  F (36.8  C) Temp src: Oral BP: 123/78  Pulse: 96   Resp: 18 SpO2: 97 % O2 Device: Nasal cannula Oxygen Delivery: 3 LPM  Vitals:    05/30/21 1530   Weight: 62.2 kg (137 lb 3.2 oz)     Vital Signs with Ranges  Temp:  [97  F (36.1  C)-98.4  F (36.9  C)] 98.3  F (36.8  C)  Pulse:  [] 96  Resp:  [18-28] 18  BP: (123-149)/(78-95) 123/78  SpO2:  [90 %-97 %] 97 %  I/O last 3 completed shifts:  In: 760 [P.O.:760]  Out: -     Constitutional: Awake, alert, cooperative, no apparent distress  HEENT: Trachea midline, sclera is clear   Respiratory: No crackles. No wheezing. Equal breath sounds bilaterally.  Cardiovascular: Regular rate and rhythm, normal S1 and S2, and no murmur noted  GI: Normal bowel sounds, soft, non-distended, non-tender  Extremities: No pitting edema     Medications       azithromycin  250 mg Oral Daily     cefTRIAXone  1 g Intravenous Daily     cetirizine  10 mg Oral Daily     clonazePAM  0.5 mg Oral TID w/meals     enoxaparin ANTICOAGULANT  40 mg Subcutaneous Daily     sodium chloride (PF)  3 mL Intracatheter Q8H     vancomycin (VANCOCIN) IV  1,000 mg Intravenous Q12H       Data   Recent Labs   Lab 06/02/21  0455 06/01/21  1025 06/01/21  0740 05/31/21  0645 05/30/21  1116   WBC 25.3*  --  16.7* 15.8* 17.0*   HGB 11.8*  --  12.0* 11.3* 12.8*   MCV 90  --  90 92 92     --  381 352 411   INR 1.34*  --   --   --   --    * 128*  --  134 134   POTASSIUM 3.6 3.7  --  4.1 4.3   CHLORIDE 89* 91*  --  99 98   CO2 31 32  --  31 34*   BUN 11 9  --  6* 9   CR 0.47* 0.44*  --  0.53* 0.70   ANIONGAP 6 5  --  4 2*   TOMMY 8.2* 8.5  --  8.6 8.6   * 121*  --  100* 142*   ALBUMIN 2.4* 2.3*  --   --  2.5*   PROTTOTAL 7.4 7.3  --   --  7.6   BILITOTAL 1.3 0.6  --   --  0.7   ALKPHOS 423* 357*  --   --  391*   * 110*  --   --  58   * 499*  --   --  114*   TROPI  --   --   --   --  <0.015       No results found for this or any previous visit (from the past 24 hour(s)).

## 2021-06-02 NOTE — PROGRESS NOTES
Home Oxygen Assessment Tools:  91% RA at rest   87% RA with activity   96% on 2L oxygen at rest  94% on  2L oxygen with activity.

## 2021-06-02 NOTE — PROGRESS NOTES
Sepsis Evaluation Progress Note    I was called to see Jesus Alberto Porras due to abnormal vital signs triggering the Sepsis SIRS screening alert. He is known to have an infection.     Physical Exam   Vital Signs:  Temp: 97.6  F (36.4  C) Temp src: Oral BP: 133/87 Pulse: 95   Resp: 28 SpO2: 95 % O2 Device: Nasal cannula Oxygen Delivery: 2 LPM     General: in no acute distress  Mental Status: baseline mental status. Per discussion with RN     Remainder of physical exam is significant for N/a    Data   Lactic Acid   Date Value Ref Range Status   05/30/2021 1.7 0.7 - 2.0 mmol/L Final   05/01/2021 0.8 0.7 - 2.0 mmol/L Final     Lactate for Sepsis Protocol   Date Value Ref Range Status   06/02/2021 2.1 (H) 0.7 - 2.0 mmol/L Final     Comment:     Significant value called to and read back by  CHRISTOPHER SARABIA [id number] 06.02.21 JFR         Assessment & Plan   Jesus Alberto Porras meets SIRS criteria but does NOT have a lactate >2 or other evidence of acute organ damage.  These vital sign, lab and physical exam findings constitute a diagnosis of SEPSIS.    Sepsis Time-Zero (time Sepsis diagnosis confirmed): N/A      Anti-infectives (From now, onward)    Start     Dose/Rate Route Frequency Ordered Stop    06/01/21 0700  cefTRIAXone (ROCEPHIN) 1 g vial to attach to  mL bag for ADULTS or NS 50 mL bag for PEDS      1 g  over 15-30 Minutes Intravenous DAILY 05/31/21 1205      06/01/21 0000  azithromycin (ZITHROMAX) 250 MG tablet      250 mg Oral DAILY 05/31/21 1207 06/03/21 2359    05/31/21 0900  azithromycin (ZITHROMAX) tablet 250 mg      250 mg Oral DAILY 05/30/21 1527 06/04/21 0859    05/31/21 0000  cefdinir (OMNICEF) 300 MG capsule      300 mg Oral 2 TIMES DAILY 05/31/21 1207 06/05/21 2359        Current antibiotic coverage is appropriate for source of infection.     Disposition: The patient will remain on the current unit with close monitoring    Discussed with RN, please notify me with any concerns should they arise  Shazia Brantley,  MD    Sepsis Criteria   Sepsis: 2+ SIRS criteria due to infection  Severe Sepsis: Sepsis AND 1+ new sign of acute organ dysfunction (Note: lactate >2 or acute encephalopathy each qualify as organ dysfunction)  Septic Shock: Sepsis AND hypotension despite volume resuscitation with 30 ml/kg crystalloid or lactate >=4  Note: HYPOTENSION is defined as 2 BP readings measured 3 hrs apart that have a SBP <90, MAP <65, or decrease >40 mmHg, occurring 6 hrs before or after t-zero

## 2021-06-02 NOTE — PLAN OF CARE
To Do:  End of Shift Summary  For vital signs and complete assessments, please see documentation flowsheets.     Pertinent assessments: VSS.  A&Ox4. Denies pain. Reports some SOB, tachypneic at times with abdominal muscle use.  Lungs diminished, shallow. On 2 L oxygen, sats low 90's. Desats with activity. Poor appetite. Stand by assist with ambulation.   Major Shift Events: Palliative consulted, Changed to DNR/DNI  Treatment Plan: Rocephin, Zithromax. Oxygen, wean as able.   Bedside Nurse: Jordan Hallman RN

## 2021-06-03 NOTE — PLAN OF CARE
Lethargic today. Abd US done, see results review. Voiding small amounts, bladder scanned for 209 and 282. Continuing Vanco, and started on Maxipime. Klonopin on hold due to lethargy. On 3L oxygen sats in the mid 90s. SO, Angela at bedside and updated with POC. SW following and Hospice referrals sent. Discharge pending.

## 2021-06-03 NOTE — ACP (ADVANCE CARE PLANNING)
SPIRITUAL HEALTH SERVICES Progress Note   RH Advance Directive Education    Responded to a consult order for assistance in facilitating the notarization of Jesus Alberto HONEY WinnVern's healthcare directive (HCD).      Met with marleni Flores and his SO Fanny and reviewed the HCD they completed.  Contacted [employer] to request a notary.    Plan: Moraima Conte will be available tomorrow morning via the bedside tablet to notarize HCD.    Sandro De León M.Div., [facility]  Staff   Phone [phone]

## 2021-06-03 NOTE — CONSULTS
Care Management Initial Consult    General Information  Assessment completed with: Patient, Spouse or significant other, Fanny DHALIWAL   Type of CM/SW Visit: Initial Assessment    Primary Care Provider verified and updated as needed:     Readmission within the last 30 days:        Reason for Consult: discharge planning  Advance Care Planning:            Communication Assessment  Patient's communication style: spoken language (English or Bilingual)    Hearing Difficulty or Deaf: no   Wear Glasses or Blind: no    Cognitive  Cognitive/Neuro/Behavioral: .WDL except  Level of Consciousness: lethargic  Arousal Level: arouses to voice  Orientation: oriented x 4  Mood/Behavior: withdrawn, calm, cooperative  Best Language: 0 - No aphasia  Speech: slow, clear, spontaneous    Living Environment:   People in home: significant other     Current living Arrangements: house      Able to return to prior arrangements: yes       Family/Social Support:  Care provided by: self  Provides care for: no one  Marital Status: Lives with Significant Other  Significant Other, Sibling(s)       Fanny  Description of Support System: Supportive, Involved    Support Assessment: Adequate family and caregiver support, Adequate social supports    Current Resources:   Patient receiving home care services: No     Community Resources: None  Equipment currently used at home: none  Supplies currently used at home: None    Employment/Financial:  Employment Status: retired        Financial Concerns: No concerns identified   Referral to Financial Counselor: No       Lifestyle & Psychosocial Needs:        Socioeconomic History     Marital status:      Spouse name: Not on file     Number of children: Not on file     Years of education: Not on file     Highest education level: Not on file     Tobacco Use     Smoking status: Former Smoker     Packs/day: 0.50     Tobacco comment: quit 16 yrs ago   Substance and Sexual Activity     Alcohol use: No     Drug use:  No       Functional Status:  Prior to admission patient needed assistance:   Dependent ADLs:: Independent  Dependent IADLs:: Independent  Assesssment of Functional Status: Not at baseline with ADL Functioning, Not at baseline with mobility, Not at  functional baseline    Mental Health Status:  Mental Health Status: No Current Concerns       Chemical Dependency Status:  Chemical Dependency Status: No Current Concerns             Values/Beliefs:  Spiritual, Cultural Beliefs, Jew Practices, Values that affect care:                 Additional Information:  Met with pt to discuss d.c home with Hospice support. PT is more willing to day to stay until MD feels he is ready to leave.. Pt will need new home o2,. Wc, home o2 and either a raised toilet seat or a seat with the hand bars around the toilet. SO prefers second options    Pt lives in a home that once he is in the home there are stairs but all needs can be met on the same level. Fanny has had experience in the past with support of Hospice and feels she is able to provide the cares that he needs for his support.     [facility] is not able to accept referral .  SW offered choice, Fanny has not prefrence at this time. Spoke with Sindi from [facility] and sent referral. Paged MD to update, not sure when pt is ready to d.c Pending treatment of his PNA..    Pt hospice diagnosis has  High-grade neuroendocrine cancer with primary of pancreas    ANA M will keep pt and Fanny updated     Corinne C. White, LSW

## 2021-06-03 NOTE — PLAN OF CARE
End of Shift Summary  For vital signs and complete assessments, please see documentation flowsheets.     Pertinent assessments: Afebrile. Denies pain. LS dim, infrequent productive cough, DAHL, supplemental O2 3 lpm in use.     Major Shift Events: Up to bathroom, unable to void, denies suprapubic discomfort, bladder scan shows 200 mls. Up to bathroom again, voided about 50 mls, scanned for 285 mls.     Treatment Plan: IVF, Rocephin, PO zithro, vanco. Pain/Palliative following. Supplemental oxygen.     Plan for abdominal US in AM, NPO since midnight.    Addendum: Pt offered toileting this AM @ 0700 declined, states no urge. Will pass on to AM nurse to continue monitoring.

## 2021-06-03 NOTE — PLAN OF CARE
End of Shift Summary  For vital signs and complete assessments, please see documentation flowsheets.     Pertinent assessments: A&O x4. Afberile, VSS. Remains on 3L NC. Denies pain & nausea. Lungs diminished, SOB with ambulation. Up assist of 1, gait-belt. Regular diet, fair appetite.     Major Shift Events: Patient decided not to leave AMA this evening. Started on IV vanco BID. Plan for abdominal US in AM, NPO @ midnight.    Treatment Plan: IVF, Rocephin, PO zithro, vanco. Pain/Palliative following. Supplemental oxygen.

## 2021-06-03 NOTE — PROGRESS NOTES
"[facility]    Hospitalist Progress Note      Assessment & Plan   Jesus Alberto Porras is a 75 year old male who was admitted on 5/30/2021.    Summary of Stay:   Jesus Alberto Porras is a 75 year old male who presents with shortness of breath and cough.     This is a 75-year-old who has a recent diagnosis of metastatic neuroendocrine malignancy, diagnosed on liver biopsy on 5/13/2021.  He follows up with Dr. Ursula Marcus from [facility].  But he has not been started on chemotherapy yet.     He presented to the emergency room with 2 to 3-day history of progressively worsening cough and shortness of breath.  According to the patient, it started with what he thought was his seasonal allergies in form of runny nose and \"tickle in the throat\".  But since yesterday, he has been coughing a lot.  He has also been experiencing shortness of breath.  No fever or chills.  No chest pain.  No abdominal symptoms.     He has not received COVID-19 vaccine.  His girlfriend who lives with him is vaccinated.  Denies coming in contact with anyone with COVID-19.  COVID-19 test was negative in the ER.     In the ER, noted to have significant leukocytosis.  At rest, his oxygen saturation was around 90% but with activity he was becoming hypoxic.  So he is requiring oxygen.  He is being admitted to internal medicine service for a possible community-acquired pneumonia.     CT chest was negative for PE. Stable to slight interval decreased size of the right lung base mass.    Plan:    Community-acquired pneumonia.  Acute hypoxic respiratory failure.  -Admission, patient was started on ceftriaxone and azithromycin.  Despite getting the antibiotics, leukocytosis has worsened.  -Yesterday, the white cell count went up to 25,000.  -Yesterday, patient wanted to leave AGAINST MEDICAL ADVICE.  He was adamant to go home.  Finally late in the evening, he decided to stay.  -Patient was started on IV vancomycin.  -Today, white cell " count is slightly better.  But he has worsening oxygen requirement.  Oxygen need up to 3 L nasal cannula.  No oxygen needed at home.  -Also appears to be tired and sleepy.  Poor oral intake.  -Change ceftriaxone to cefepime.  Also started on vancomycin yesterday.  Monitor white cell count.  -Despite that the leukocytosis is worsening.  Although clinically patient does not feel worse.  -No other obvious source of infection.  No abdominal symptoms.  Denies any diarrhea/nausea/vomiting.  No new back pain or joint pain.  No sore throat.  No urinary symptoms.  -Right upper quadrant ultrasound shows metastatic disease.  No obvious abscess seen.     Elevated LFTs.  Elevated alk phos and AST.  -related to neuroendocrine tumor with metastasis to the liver  -Patient denies any new abdominal pain, nausea or vomiting.  -Right upper quadrant ultrasound shows metastatic disease.  No obvious abscess.     Hyponatremia  --started on NS on 6/2/21. monitor.  Could be related to underlying malignancy and pulmonary involvement.      Malignant neuroendocrine tumor.  -Thought to be metastatic in nature.  Unclear what the primary is, possibly pancreatic.  -Follow-up with [facility], Dr. Marcus.  -Seen by palliative care team.  Patient does not want to consider chemotherapy at this point.  Wants to consider hospice care.    Anxiety disorder  -On Klonopin 3 times a day.  Hold as appears rather sleepy today.    Palliative care consult appreciated.  At this point the plan is to treat the infection with antibiotics.  Once he is stable for discharge then discharged home with hospice care.  Currently he is not on comfort measures.  DNR/DNI.       DVT Prophylaxis: Enoxaparin (Lovenox) SQ  Code Status: No CPR- Do NOT Intubate  Expected discharge: 2-3 days    Deangelo Eaton MD  [employer] (7am - 6pm, M-F)    Interval History   Patient was evaluated with nursing staff. Overnight issues discussed.    Review of systems:   Patient appeared sleepy.   Falling asleep while I was talking to him.  Otherwise feels well.  Has a cough.  I informed him that we should keep him in the hospital for another couple of days.  Explained to him the ultrasound results as well.    -Data reviewed today: Labs and medications.    Physical Exam   Temp: 97.6  F (36.4  C) Temp src: Axillary BP: 104/64 Pulse: 87   Resp: 16 SpO2: 98 % O2 Device: Nasal cannula Oxygen Delivery: 3 LPM  Vitals:    05/30/21 1530   Weight: 62.2 kg (137 lb 3.2 oz)     Vital Signs with Ranges  Temp:  [97.6  F (36.4  C)-98.3  F (36.8  C)] 97.6  F (36.4  C)  Pulse:  [83-97] 87  Resp:  [16-18] 16  BP: ()/(62-89) 104/64  SpO2:  [95 %-98 %] 98 %  I/O last 3 completed shifts:  In: 1656 [P.O.:660; I.V.:996]  Out: 50 [Urine:50]    Constitutional: Sleepy but arousable.  HEENT: Trachea midline, sclera is clear   Respiratory: Minimal crackles, no wheezing, decreased air entry at the bases.  Cardiovascular: Regular rate and rhythm, normal S1 and S2, and no murmur noted  GI: Normal bowel sounds, soft, non-distended, non-tender  Extremities: No pitting edema     Medications     sodium chloride 75 mL/hr at 06/03/21 1006       ceFEPIme (MAXIPIME) IV  1 g Intravenous TID     cetirizine  10 mg Oral Daily     clonazePAM  0.5 mg Oral TID w/meals     enoxaparin ANTICOAGULANT  40 mg Subcutaneous Daily     sodium chloride (PF)  3 mL Intracatheter Q8H     vancomycin (VANCOCIN) IV  1,000 mg Intravenous Q12H       Data   Recent Labs   Lab 06/03/21  0639 06/02/21  0455 06/01/21  1025 06/01/21  0740 05/31/21  0645 05/30/21  1116   WBC 21.3* 25.3*  --  16.7* 15.8* 17.0*   HGB 10.8* 11.8*  --  12.0* 11.3* 12.8*   MCV 95 90  --  90 92 92    296  --  381 352 411   INR  --  1.34*  --   --   --   --    NA  --  126* 128*  --  134 134   POTASSIUM  --  3.6 3.7  --  4.1 4.3   CHLORIDE  --  89* 91*  --  99 98   CO2  --  31 32  --  31 34*   BUN  --  11 9  --  6* 9   CR  --  0.47* 0.44*  --  0.53* 0.70   ANIONGAP  --  6 5  --  4 2*   TOMMY   --  8.2* 8.5  --  8.6 8.6   GLC  --  133* 121*  --  100* 142*   ALBUMIN  --  2.4* 2.3*  --   --  2.5*   PROTTOTAL  --  7.4 7.3  --   --  7.6   BILITOTAL  --  1.3 0.6  --   --  0.7   ALKPHOS  --  423* 357*  --   --  391*   ALT  --  114* 110*  --   --  58   AST  --  385* 499*  --   --  114*   TROPI  --   --   --   --   --  <0.015       Recent Results (from the past 24 hour(s))   US Abdomen Limited    Narrative    ULTRASOUND ABDOMEN LIMITED 6/3/2021 8:58 AM    CLINICAL HISTORY: Elevated LFTs, leukocytosis. Assess for  abscess/cholecystitis. History of   neuroendocrine tumor.    TECHNIQUE: Limited abdominal ultrasound.    COMPARISON: CT 5/1/2021    FINDINGS:    GALLBLADDER: Surgically absent.    BILE DUCTS: There is no biliary dilatation. The common duct measures  5mm.    LIVER: The liver is enlarged, measuring 20.0 cm in length. There is  marked hepatic heterogeneity with several probable focal lesions much  better demonstrated on prior CT and compatible with metastatic  disease.    RIGHT KIDNEY: No hydronephrosis.    PANCREAS: Obscured by intestinal gas.    No ascites.      Impression    IMPRESSION:  1.  The liver appears to be diffusely involved with metastatic disease  and is enlarged.    ABIGAIL WOOD MD

## 2021-06-04 NOTE — PROGRESS NOTES
Oncology/Hematology Follow Up Note:    Assessment and Plan:  Jesus Alberto Porras is a 75 year old male patient admitted 5/31 with acute respiratory failure.     1.  Patient has high-grade neuroendocrine cancer with pancreatic mass, lymphadenopathy, numerous liver lesions, right middle lobe mass and chest lymphadenopathy  -High-grade neuroendocrine in a non-smoker with primary of pancreas versus lung  -Previously discussed with patient best supportive care versus consideration of chemotherapy with carboplatin and etoposide versus consideration of chemotherapy with immunotherapy  -Patient and significant other had made no decisions and he was not certain he would do any chemotherapy.  -He was aware that with aggressive cancer his life expectancy is 3 to 6 months  -Brain MRI negative     PLAN:  Long discussion with patient and his significant other Angela and his condition has declined even since the past 10 days.  We discussed best supportive care versus chemo.  He is not in a condition to tolerate chemo right now.      -- We discussed hospice and palliative care and consult will be placed accordingly, Appreciate the collaboration, patient is not certain he will sign onto hospice yet but agrees that he does not want chemotherapy.  -- Appreciate palliative care.        2.  Hypoxic respiratory failure with concern for possible pneumonia  -Negative for PE  -On antibiotics  -On oxygen  -Chest CT personally reviewed and agree with assessment shows stable right lung base mass, enlarged lymph nodes in the mediastinum and hilar region appears stable.        3. DNR/DNI      Update, discussed with patient and previously with significant other and plan for discharge is being done in [facility]. No chemotherapy planned. Will arrange hospice chart check     Subjective:    Weak, very tired but anxious to go home      Labs:  All labs reviewed    CBC  Recent Labs   Lab 06/04/21  0459 06/03/21  0639 06/02/21  0455   WBC 20.3* 21.3*  25.3*   HGB 11.5* 10.8* 11.8*   MCV 96 95 90    282 296       CMP  Recent Labs   Lab 06/04/21  0459 06/03/21  1417 06/02/21  0455 06/01/21  1025 05/30/21  1116 05/30/21  1116   * 129* 126* 128*   < > 134   POTASSIUM 4.4 4.2 3.6 3.7   < > 4.3   CHLORIDE 95 92* 89* 91*   < > 98   CO2 33* 35* 31 32   < > 34*   ANIONGAP 3 2* 6 5   < > 2*   * 115* 133* 121*   < > 142*   BUN 13 16 11 9   < > 9   CR 0.39* 0.49* 0.47* 0.44*   < > 0.70   GFRESTIMATED >90 >90 >90 >90   < > >90   GFRESTBLACK >90 >90 >90 >90   < > >90   TOMMY 8.5 8.4* 8.2* 8.5   < > 8.6   PROTTOTAL 6.9  --  7.4 7.3  --  7.6   ALBUMIN 2.0*  --  2.4* 2.3*  --  2.5*   BILITOTAL 0.9  --  1.3 0.6  --  0.7   ALKPHOS 419*  --  423* 357*  --  391*   *  --  385* 499*  --  114*   ALT 80*  --  114* 110*  --  58    < > = values in this interval not displayed.       INR  Recent Labs   Lab 06/02/21  0455   INR 1.34*       Blood Culture  Recent Labs   Lab 05/30/21  1329 05/30/21  1315   CULT No growth after 5 days No growth after 5 days         Ursula Marcus MD  [facility]  6/4/2021 5:46 PM

## 2021-06-04 NOTE — PROGRESS NOTES
"BRIEF NUTRITION ASSESSMENT      REASON FOR ASSESSMENT:  Jesus Alberto Porras is a 75 year old male seen by Registered Dietitian for LOS.    NUTRITION HISTORY:  - Information obtained from chart as plans for discharge on hospice.  - H/o metastatic neuroendocrine malignancy (diagnosed 5/13/2021).    - Admit 2/2 SOB, found to have PNA.  - Plan is to treat PNA/infection and then discharge to home on hospice.  - NKFA.    CURRENT DIET AND INTAKE:  Diet: Regular, Ensure between meals ordered by palliative care team.            % intakes since admission.  Discussed with RN that supplement can be ongoing or discontinued per patient preference.    ANTHROPOMETRICS:  Height: 5' 8.5\"  Weight:  137 lbs 3.2 oz  Body mass index is 20.56 kg/m .   Weight Status: Normal BMI  Weight History:  Wt Readings from Last 10 Encounters:   05/30/21 62.2 kg (137 lb 3.2 oz)   02/17/15 68.7 kg (151 lb 7.3 oz)   02/10/15 65.5 kg (144 lb 4.8 oz)   11/27/06 62.9 kg (138 lb 9.6 oz)   - No previous wt trending available (care everywhere reviewed).    LABS:  Labs noted    MALNUTRITION:  Deferred NFPE given hospice at discharge, therefore unable to evaluate.    NUTRITION INTERVENTION:  Nutrition Diagnosis:  No nutrition diagnosis at this time.    Implementation:  Nutrition Education: Per provider order if indicated.    FOLLOW UP/MONITORING:   Will not follow per policy as patient is discharging on hospice over the weekend.  Please formally consult while admitted if needs arise.        Sue Senior RDN, LD  Clinical Dietitian  3rd floor/ICU: [phone]  All other floors: [phone]  Weekend/holiday: [phone]    "

## 2021-06-04 NOTE — PLAN OF CARE
End of Shift Summary  For vital signs and complete assessments, please see documentation flowsheets.     Pertinent assessments: Denies pain. Lethargic/somnolent. When awake A&O x4. Cooperative w/cares. LS dim, congested cough, DAHL, remains on 3 lpm supplemental oxygen.   Major Shift Events: Unsteady/weak, requiring 2 person assist w/walker, BSC in use now.   Treatment Plan: IVF, Maxipime, Vanco. Supportive cares. Plan for HCD notarization in the morning around 0930 via video call. Oncology, palliative, SW & SH following.

## 2021-06-04 NOTE — DISCHARGE SUMMARY
"[facility]  Discharge Summary  Hospitalist      Date of Admission:  5/30/2021  Date of Discharge:  6/5/2021  Provider:  Corrine Martinez MD  Date of Service (when I last saw the patient): 06/04/21      Primary Provider: Fiorella Ref-Primary, Physician          Discharge Diagnosis:     Discharge Diagnoses   Sepsis secondary Acute hypoxic respiratory failure secondary to community-acquired pneumonia  Malignant neuroendocrine tumor  Transaminitis due to liver mets  Hyponatremia  Anxiety disorder  Hospice care evaluation consult.  Plans to be discharged on home hospice care      Other medical issues:  Past Medical History:   Diagnosis Date    CSF leak from ear     Gastro-oesophageal reflux disease     Seizures (H)     Sexual dysfunction           History of Present Illness   Jesus Alberto Porras is an 75 year old male who presented with worsening shortness of breath and cough.  Please see the admission history and physical for full details.    Hospital Course     Jesus Alberto Porras was admitted on 5/30/2021.  This is a 75-year-old who has a recent diagnosis of metastatic neuroendocrine malignancy, diagnosed on liver biopsy on 5/13/2021.  He follows up with Dr. Ursula Marcus from [facility].  But he has not been started on chemotherapy yet.     He presented to the emergency room with 2 to 3-day history of progressively worsening cough and shortness of breath.  According to the patient, it started with what he thought was his seasonal allergies in form of runny nose and \"tickle in the throat\".  And subsequently developed cough and shortness of breath.  No fever or chills.  No chest pain.  No abdominal symptoms.     He has not received COVID-19 vaccine.  His girlfriend who lives with him is vaccinated.  Denies coming in contact with anyone with COVID-19.  COVID-19 test was negative in the ER.     In the ER, noted to have significant leukocytosis.  At rest, his oxygen saturation was around 90% but with " activity he was becoming hypoxic.  So he is requiring oxygen.  He was admitted to internal medicine service for a possible community-acquired pneumonia.  Treated with ceftriaxone and azithromycin initially for community-acquired pneumonia.  Despite on antibiotic had worsening leukocytosis and O2 needs was switched to vancomycin with some improvement.     CT chest was negative for PE. Stable to slight interval decreased size of the right lung base mass.    During hospitalization Patient met with hospice team  and would like to go home on hospice does not want any aggressive measures. He is being discharged home with hospice care.      Significant Results and Procedures   As noted above    Pending Results   Unresulted Labs Ordered in the Past 30 Days of this Admission       Date and Time Order Name Status Description    5/30/2021 1253 Blood culture Preliminary     5/30/2021 1253 Blood culture Preliminary             Code Status   Comfort Care       Primary Care Physician   Physician No Ref-Primary    Physical Exam   Temp: 97.6  F (36.4  C) Temp src: Oral BP: (!) 115/22 Pulse: 90   Resp: 24 SpO2: 97 % O2 Device: Nasal cannula Oxygen Delivery: 3 LPM  Vitals:    05/30/21 1530   Weight: 62.2 kg (137 lb 3.2 oz)     Vital Signs with Ranges  Temp:  [97.6  F (36.4  C)-98.1  F (36.7  C)] 97.6  F (36.4  C)  Pulse:  [75-91] 90  Resp:  [18-24] 24  BP: ()/(22-74) 115/22  SpO2:  [95 %-97 %] 97 %  I/O last 3 completed shifts:  In: 2628 [P.O.:390; I.V.:2238]  Out: 600 [Urine:600]    Constitutional:  He is awake but intermittently drowsy and sleepy   HEENT: Trachea midline, sclera is clear   Respiratory: Minimal crackles, no wheezing, decreased air entry at the bases.  Cardiovascular: Regular rate and rhythm, normal S1 and S2, and no murmur noted  GI: Normal bowel sounds, soft, non-distended, non-tender  Extremities: trace edema     Discharge Disposition   Discharged to home    Consultations This Hospital Stay   PALLIATIVE CARE  ADULT IP CONSULT  SOCIAL WORK IP CONSULT  ADVANCE DIRECTIVE IP CONSULT  PHARMACY TO DOSE VANCO    Time Spent on this Encounter   I, Corrine Martinez MD, personally saw the patient today and spent greater than 30 minutes discharging this patient.    Discharge Orders      Home care nursing referral      Home Care Social Service Referral for Hospital Discharge      Reason for your hospital stay    Community acquired pneumonia     Follow-up and recommended labs and tests     Follow up with primary care provider, Physician No Ref-Primary, within 7 days for hospital follow- up.     Activity    Your activity upon discharge: activity as tolerated     Reason for your hospital stay    Please refer to discharge summary     MD face to face encounter    Documentation of Face to Face and Certification for Home Health Services    I certify that patient: Jesus Alberto Porras is under my care and that I, or a nurse practitioner or physician's assistant working with me, had a face-to-face encounter that meets the physician face-to-face encounter requirements with this patient on: 6/4/2021.    This encounter with the patient was in whole, or in part, for the following medical condition, which is the primary reason for home health care: Hospice care.    I certify that, based on my findings, the following services are medically necessary home health services: Social Work and hospice care.    My clinical findings support the need for the above services because: Nurse is needed: To help with hospice care    Further, I certify that my clinical findings support that this patient is homebound (i.e. absences from home require considerable and taxing effort and are for medical reasons or [employer] services or infrequently or of short duration when for other reasons) because: Requires assistance of another person or specialized equipment to access medical services because patient: Requires supervision of another for safe transfer.  Severe  deconditioning malignant disease and weakness..    Based on the above findings. I certify that this patient is confined to the home and needs intermittent skilled nursing care, physical therapy and/or speech therapy.  The patient is under my care, and I have initiated the establishment of the plan of care.  This patient will be followed by a physician who will periodically review the plan of care.  Physician/Provider to provide follow up care: No Ref-Primary, Physician    Attending hospital physician (the Medicare certified [facility] provider): Corrine Martinez MD  Physician Signature: See electronic signature associated with these discharge orders.  Date: 6/4/2021     Special Code    Discharged home on home hospice     Oxygen Adult/Peds     Diet    Follow this diet upon discharge: Orders Placed This Encounter      Advance Diet as Tolerated: Regular Diet Adult     Discharge Medications   Current Discharge Medication List        START taking these medications    Details   acetaminophen (TYLENOL) 650 MG suppository Place 1 suppository (650 mg) rectally every 4 hours as needed for fever or mild pain  Qty: 4 suppository, Refills: 0    Associated Diagnoses: Pneumonia due to infectious organism, unspecified laterality, unspecified part of lung; Neuroendocrine cancer (H); Hospice care patient      azithromycin (ZITHROMAX) 250 MG tablet Take 1 tablet (250 mg) by mouth daily for 2 days  Qty: 2 tablet, Refills: 0    Associated Diagnoses: Pneumonia due to infectious organism, unspecified laterality, unspecified part of lung      bisacodyl (DULCOLAX) 10 MG suppository Place 1 suppository (10 mg) rectally daily as needed for constipation  Qty: 1 suppository, Refills: 0    Associated Diagnoses: Pneumonia due to infectious organism, unspecified laterality, unspecified part of lung; Neuroendocrine cancer (H); Hospice care patient      cefdinir (OMNICEF) 300 MG capsule Take 1 capsule (300 mg) by mouth 2 times daily for 5 days  Qty:  10 capsule, Refills: 0    Associated Diagnoses: Pneumonia due to infectious organism, unspecified laterality, unspecified part of lung      haloperidol (HALDOL) 2 MG/ML (HIGH CONC) solution Take 1 mL (2 mg) by mouth every 6 hours as needed for agitation or other (nausea)  Qty: 15 mL, Refills: 0    Associated Diagnoses: Pneumonia due to infectious organism, unspecified laterality, unspecified part of lung; Neuroendocrine cancer (H); Hospice care patient      morphine 10 MG/5ML solution Take 1.25 mLs (2.5 mg) by mouth every 2 hours as needed for pain (dyspnea)  Qty: 40 mL, Refills: 0    Associated Diagnoses: Pneumonia due to infectious organism, unspecified laterality, unspecified part of lung; Neuroendocrine cancer (H); Hospice care patient           CONTINUE these medications which have CHANGED    Details   clonazePAM (KLONOPIN) 0.5 MG tablet Take 1 tablet (0.5 mg) by mouth 3 times daily (with meals)  Qty: 12 tablet, Refills: 0    Associated Diagnoses: Pneumonia due to infectious organism, unspecified laterality, unspecified part of lung; Neuroendocrine cancer (H); Hospice care patient           CONTINUE these medications which have NOT CHANGED    Details   azelastine (ASTELIN) 0.1 % nasal spray Spray 1 spray into both nostrils daily      brimonidine (ALPHAGAN) 0.2 % ophthalmic solution Place 1 drop into the right eye 2 times daily      cetirizine (ZYRTEC) 10 MG tablet Take 10 mg by mouth daily      dorzolamide-timolol (COSOPT) 2-0.5 % ophthalmic solution Place 1 drop into the right eye 2 times daily      ketorolac (ACULAR) 0.5 % ophthalmic solution Place 1 drop into the right eye 2 times daily      montelukast (SINGULAIR) 10 MG tablet Take 10 mg by mouth At Bedtime      prochlorperazine (COMPAZINE) 10 MG tablet Take 10 mg by mouth every 6 hours as needed for nausea or vomiting           Allergies   Allergies   Allergen Reactions    Dilantin [Phenytoin] Rash    Amoxicillin Rash    Carbamazepine Rash     Data   Most  Recent 3 CBC's:  Recent Labs   Lab Test 06/04/21  0459 06/03/21  0639 06/02/21  0455   WBC 20.3* 21.3* 25.3*   HGB 11.5* 10.8* 11.8*   MCV 96 95 90    282 296      Most Recent 3 BMP's:  Recent Labs   Lab Test 06/04/21  0459 06/03/21  1417 06/02/21  0455   * 129* 126*   POTASSIUM 4.4 4.2 3.6   CHLORIDE 95 92* 89*   CO2 33* 35* 31   BUN 13 16 11   CR 0.39* 0.49* 0.47*   ANIONGAP 3 2* 6   TOMMY 8.5 8.4* 8.2*   * 115* 133*     Most Recent 2 LFT's:  Recent Labs   Lab Test 06/04/21  0459 06/02/21  0455   * 385*   ALT 80* 114*   ALKPHOS 419* 423*   BILITOTAL 0.9 1.3     Most Recent INR's and Anticoagulation Dosing History:  Anticoagulation Dose History       Recent Dosing and Labs Latest Ref Rng & Units 2/16/2015 2/17/2015 5/13/2021 6/2/2021    INR 0.86 - 1.14 0.96 1.14 1.13 1.34(H)          Most Recent 3 Troponin's:  Recent Labs   Lab Test 05/30/21  1116   TROPI <0.015     Most Recent Cholesterol Panel:No lab results found.  Most Recent 6 Bacteria Isolates From Any Culture (See EPIC Reports for Culture Details):  Recent Labs   Lab Test 05/30/21  1329 05/30/21  1315   CULT No growth after 5 days No growth after 5 days     Most Recent TSH, T4 and A1c Labs:No lab results found.  Results for orders placed or performed during the hospital encounter of 05/30/21   CT Chest Pulmonary Embolism w Contrast    Narrative    CT CHEST PULMONARY EMBOLISM WITH CONTRAST 5/30/2021 12:03 PM    CLINICAL HISTORY: Pulmonary embolus suspected, high probability.  Hypoxia, cough, fever, recent pancreatic cancer diagnosis.    TECHNIQUE: CT angiogram chest during arterial phase injection IV  contrast. 2D and 3D MIP reconstructions were performed by the CT  technologist. Dose reduction techniques were used.     CONTRAST: 55mL Isovue-370    COMPARISON: CT chest 5/24/2021.    FINDINGS:  ANGIOGRAM CHEST: Pulmonary arteries are normal caliber and negative  for pulmonary emboli. Thoracic aorta is negative for dissection. No  CT  evidence of right heart strain.    LUNGS AND PLEURA: Scarring and architectural distortion noted at the  right lung base. Lungs are otherwise clear. Medial right lung base  nodular density measures 1.6 x 1.4 cm on series 6, image 69,  previously 1.8 x 1.7 cm. No other lung lesions are appreciated. No  pleural effusions.    MEDIASTINUM/AXILLAE: Heart is normal in size. No pericardial fluid.  Moderate-to-severe coronary artery calcification. Thoracic aorta is  unremarkable. Enlarged mediastinal and hilar lymph nodes are present.  Right hilar lymph node on series 6, image 71 measures 1.2 cm short  axis which is stable. Left mediastinal node measures 2.2 x 2.0 cm,  previously 2.2 x 1.9 cm which is stable. Additional prevascular and  mediastinal nodes are unchanged.    UPPER ABDOMEN: Liver appears enlarged but unchanged. Subtle  hypodensities consistent with known metastases. Pancreatic tail mass  partially imaged as better described on prior CT abdomen/pelvis  5/1/2021.    MUSCULOSKELETAL: Normal.      Impression    IMPRESSION:  1.  Stable to slight interval decreased size of the right lung base  mass. Enlarged lymph nodes in the mediastinum and hilar regions appear  stable. No new lung lesions.  2.  No evidence of pulmonary embolism. Thoracic aorta is unremarkable.  3.  Pancreatic tail mass and liver metastases described on prior  abdomen/pelvis CT are only partially imaged on this exam.    KATHY CONTRERAS MD   XR Chest 2 Views    Narrative    CHEST TWO VIEWS June 1, 2021 9:29 AM     HISTORY: Pneumonia follow-up.    COMPARISON: Chest x-ray 4/25/2006. CT chest 5/30/2021.      Impression    IMPRESSION: PA and lateral views of the chest. Lungs are clear. New  elevation right hemidiaphragm. Heart is normal in size. No effusions  are evident. No pneumothorax.    KATHY CONTRERAS MD   US Abdomen Limited    Narrative    ULTRASOUND ABDOMEN LIMITED 6/3/2021 8:58 AM    CLINICAL HISTORY: Elevated LFTs, leukocytosis. Assess  for  abscess/cholecystitis. History of   neuroendocrine tumor.    TECHNIQUE: Limited abdominal ultrasound.    COMPARISON: CT 5/1/2021    FINDINGS:    GALLBLADDER: Surgically absent.    BILE DUCTS: There is no biliary dilatation. The common duct measures  5mm.    LIVER: The liver is enlarged, measuring 20.0 cm in length. There is  marked hepatic heterogeneity with several probable focal lesions much  better demonstrated on prior CT and compatible with metastatic  disease.    RIGHT KIDNEY: No hydronephrosis.    PANCREAS: Obscured by intestinal gas.    No ascites.      Impression    IMPRESSION:  1.  The liver appears to be diffusely involved with metastatic disease  and is enlarged.    ABIGAIL WOOD MD           Disclaimer: This note consists of symbols derived from keyboarding, dictation and/or voice recognition software. As a result, there may be errors in the script that have gone undetected. Please consider this when interpreting information found in this chart.

## 2021-06-04 NOTE — PHARMACY-VANCOMYCIN DOSING SERVICE
Pharmacy Vancomycin Note  Date of Service 2021  Patient's  1945   75 year old, male    Indication: Community Acquired Pneumonia  Day of Therapy: 3  Current vancomycin regimen:  1,000 mg IV q12h  Current vancomycin monitoring method: Trough (Method 1 = dosing nomogram)  Current vancomycin therapeutic monitoring goal: 10-15 mg/L; -600.    Current estimated CrCl = Estimated Creatinine Clearance: 139.6 mL/min (A) (based on SCr of 0.39 mg/dL (L)).    Creatinine for last 3 days  2021:  4:55 AM Creatinine 0.47 mg/dL  6/3/2021:  2:17 PM Creatinine 0.49 mg/dL  2021:  4:59 AM Creatinine 0.39 mg/dL    Recent Vancomycin Levels (past 3 days)  2021:  4:59 AM Vancomycin Level 10.6 mg/L    Vancomycin IV Administrations (past 72 hours)                   vancomycin (VANCOCIN) 1000 mg in dextrose 5% 200 mL PREMIX (mg) 1,000 mg New Bag 21 0618     1,000 mg New Bag 21 1747     1,000 mg New Bag  0605     1,000 mg New Bag 21 1818                Nephrotoxins and other renal medications (From now, onward)    Start     Dose/Rate Route Frequency Ordered Stop    21 1800  vancomycin (VANCOCIN) 1000 mg in dextrose 5% 200 mL PREMIX      1,000 mg  200 mL/hr over 1 Hours Intravenous EVERY 12 HOURS 21 1741               Contrast Orders - past 72 hours (72h ago, onward)    None          Interpretation of levels and current regimen:  Vancomycin level is reflective of -600, therapeutic trough level    Has serum creatinine changed greater than 50% in last 72 hours: No    Urine output:  unable to determine    Renal Function: Stable    Loading dose: N/A  Regimen: 1000 mg every 12 hours for 8 doses.  Start time: 06:18 on 2021  Exposure target: AUC24 (range)400-600 mg/L.hr   AUC24,ss: 411 mg/L.hr  PAUC*: 56 %  Ctrough,ss: 11.3 mg/L  Pconc*: 2 %  Tox.: 7 %    Plan:  1. Continue Current Dose  2. Vancomycin monitoring method: AUC  3. Vancomycin therapeutic monitoring goal: 400-600  mg*h/L  4. Pharmacy will check vancomycin levels as appropriate in 1-3 Days.  5. Serum creatinine levels will be ordered a minimum of twice weekly.    Mariana Morris RPH

## 2021-06-04 NOTE — PROGRESS NOTES
[facility]  Palliative Care Progress Note  Text Page     Assessment & Plan   Recommendations:  1. Goals of Care- No CPR- Do NOT Intubate  Hospitalization goals discussed patient and significant other Fanny Renae.    Decisional Capacity- Intact. Patient does not have an advance directive. Per  informed consent policy next of kin should be involved if patient becomes unable.  Next of kin includes patient's sister and brother.    - POLST completed today indicating No CPR - No intubation and comfort focused care.  - coordination of care to discharge on hospice benefit.  - hospice medications written for discharge onto hospice     3. Dyspnea  - supplemental oxygen  - continue with treatment of underlying source  - offer IS, HOB elevated     4. Malnutrition  - supplements with Boost Plus in between meals.  - recommend dietician consult to offer nutritional planning to patient and S/O     5. Spiritual Care  Spiritual Health Services declined at this time.  Spiritual Background: none identified.     6. Care Planning  Appreciate Care of multidisciplinary team.    - coordination for hospice discharge pending.     Medical Decision Making and Goals of Care:  Met with Mark and Fanny at the bedside.  Reviewed hospice medications that will be sent home at time of discharge.  Denied questions.    Discussed on June 2, 2021 with Lauren Adkins APRN, CNP:   Met with Mark and Fanny at the bedside.  Reviewed their understanding of the plan of care.  Mark is frustrated and states he is going home today, appears restless and not happy.  Fanny states he had increased WBC and so she thinks the doctors recommend that he stay in the hospital.    I reviewed options for continuing care in the hospital or discharging home on hospice in the coming days.  Mark stated he wanted to go home.  I reviewed hospice services and offered that hospice would be the recommended resource to provide him with at discharge  in order to facilitate a safe plan.  He verbalized agreement and understanding.  Reviewed POLST form, Mark agreed that he would not want CPR or intubation.  Agreed that he wants to focus on comfort measures and that he would not want to come back to the hospital for management.   SW consult placed and Fanny and Mark were educated on SW role in discharge planning.  Fanny inquired about health care directive, short form provided and  consult placed for ACP facilitation.    Denied further questions.       Lauren Adkins APRN, CNP  Pain Management and Palliative Care  [facility]  Pgr: [phone]      Time Spent on this Encounter   Total unit/floor time 15 minutes, time consisted of the following, examination of the patient, reviewing the record and completing documentation. >50% of time spent in counseling and coordination of care, Bedside Nurse Maty Sanches and Hospitalist Dr. Martinez.  Time spend counseling with patient and family consisted of the following topics, care planning for discharge and symptom management.    Review of Systems    CONSTITUTIONAL: NEGATIVE for fever, chills, change in weight  ENT/MOUTH: NEGATIVE for ear, mouth and throat problems  RESP: NEGATIVE for significant cough or SOB  CV: NEGATIVE for chest pain, palpitations or peripheral edema    Physical Exam   Temp:  [97.6  F (36.4  C)-98.1  F (36.7  C)] 97.6  F (36.4  C)  Pulse:  [75-91] 90  Resp:  [16-24] 24  BP: ()/(22-74) 115/22  SpO2:  [95 %-98 %] 97 %  137 lbs 3.2 oz  Exam:  GEN:  Arouses to voice, oriented x 3, NAD.  HEENT:  Normocephalic/atraumatic, no scleral icterus, no nasal discharge, mouth moist.  CV:  RRR, S1, S2; no murmurs or other irregularities noted.  +3 DP/PT pulses bilatererally; no edema BLE.  RESP:  Symmetric chest rise on inhalation noted.   ABD:  Rounded, firm, non-tender.  +BS  EXT:  Edema & pulses as noted above.  CMS intact x 4.      SKIN:  Dry to touch, no exanthems noted  in the visualized areas.    PAIN BEHAVIOR: Cooperative  Psych:  Normal affect.  Calm, cooperative, conversant appropriately.       Medications     sodium chloride 75 mL/hr at 06/03/21 2319       ceFEPIme (MAXIPIME) IV  1 g Intravenous TID     cetirizine  10 mg Oral Daily     [Held by provider] clonazePAM  0.5 mg Oral TID w/meals     enoxaparin ANTICOAGULANT  40 mg Subcutaneous Daily     sodium chloride (PF)  3 mL Intracatheter Q8H     vancomycin (VANCOCIN) IV  1,000 mg Intravenous Q12H       Data   Results for orders placed or performed during the hospital encounter of 05/30/21 (from the past 24 hour(s))   US Abdomen Limited    Narrative    ULTRASOUND ABDOMEN LIMITED 6/3/2021 8:58 AM    CLINICAL HISTORY: Elevated LFTs, leukocytosis. Assess for  abscess/cholecystitis. History of   neuroendocrine tumor.    TECHNIQUE: Limited abdominal ultrasound.    COMPARISON: CT 5/1/2021    FINDINGS:    GALLBLADDER: Surgically absent.    BILE DUCTS: There is no biliary dilatation. The common duct measures  5mm.    LIVER: The liver is enlarged, measuring 20.0 cm in length. There is  marked hepatic heterogeneity with several probable focal lesions much  better demonstrated on prior CT and compatible with metastatic  disease.    RIGHT KIDNEY: No hydronephrosis.    PANCREAS: Obscured by intestinal gas.    No ascites.      Impression    IMPRESSION:  1.  The liver appears to be diffusely involved with metastatic disease  and is enlarged.    ABIGAIL WOOD MD   Basic metabolic panel   Result Value Ref Range    Sodium 129 (L) 133 - 144 mmol/L    Potassium 4.2 3.4 - 5.3 mmol/L    Chloride 92 (L) 94 - 109 mmol/L    Carbon Dioxide 35 (H) 20 - 32 mmol/L    Anion Gap 2 (L) 3 - 14 mmol/L    Glucose 115 (H) 70 - 99 mg/dL    Urea Nitrogen 16 7 - 30 mg/dL    Creatinine 0.49 (L) 0.66 - 1.25 mg/dL    GFR Estimate >90 >60 mL/min/[1.73_m2]    GFR Estimate If Black >90 >60 mL/min/[1.73_m2]    Calcium 8.4 (L) 8.5 - 10.1 mg/dL   Vancomycin level   Result  Value Ref Range    Vancomycin Level 10.6 mg/L   Comprehensive metabolic panel   Result Value Ref Range    Sodium 131 (L) 133 - 144 mmol/L    Potassium 4.4 3.4 - 5.3 mmol/L    Chloride 95 94 - 109 mmol/L    Carbon Dioxide 33 (H) 20 - 32 mmol/L    Anion Gap 3 3 - 14 mmol/L    Glucose 107 (H) 70 - 99 mg/dL    Urea Nitrogen 13 7 - 30 mg/dL    Creatinine 0.39 (L) 0.66 - 1.25 mg/dL    GFR Estimate >90 >60 mL/min/[1.73_m2]    GFR Estimate If Black >90 >60 mL/min/[1.73_m2]    Calcium 8.5 8.5 - 10.1 mg/dL    Bilirubin Total 0.9 0.2 - 1.3 mg/dL    Albumin 2.0 (L) 3.4 - 5.0 g/dL    Protein Total 6.9 6.8 - 8.8 g/dL    Alkaline Phosphatase 419 (H) 40 - 150 U/L    ALT 80 (H) 0 - 70 U/L     (H) 0 - 45 U/L   CBC with platelets   Result Value Ref Range    WBC 20.3 (H) 4.0 - 11.0 10e9/L    RBC Count 3.97 (L) 4.4 - 5.9 10e12/L    Hemoglobin 11.5 (L) 13.3 - 17.7 g/dL    Hematocrit 38.2 (L) 40.0 - 53.0 %    MCV 96 78 - 100 fl    MCH 29.0 26.5 - 33.0 pg    MCHC 30.1 (L) 31.5 - 36.5 g/dL    RDW 14.6 10.0 - 15.0 %    Platelet Count 304 150 - 450 10e9/L

## 2021-06-04 NOTE — PLAN OF CARE
End of Shift Summary  For vital signs and complete assessments, please see documentation flowsheets.     Pertinent assessments: Pt alert today, oriented x4. Up with 1 assist to commode. 3L O2 NC. Pt showered with 1 assist.     Major Shift Events: none    Treatment Plan: Discharge home on hospice  when o2 delivered. Meds in lock box and cabinet in med room.

## 2021-06-04 NOTE — PROGRESS NOTES
Care Management Discharge Note    Discharge Date: 06/06/21       Discharge Disposition: HOme   Discharge Services: Hospice     Discharge DME: Oxygen, Wheelchair, Tub Transfer Bench    Discharge Transportation: family or friend will provide    Private pay costs discussed: Not applicable    PAS Confirmation Code:    Patient/family educated on Medicare website which has current facility and service quality ratings: no    Education Provided on the Discharge Plan:    Persons Notified of Discharge Plans: Fanny and pt   Patient/Family in Agreement with the Plan: yes    Handoff Referral Completed: No    Additional Information:     06/04/21 0902   Final Resources   Hospice [facility] [phone]     Left message for [phone] to update now plan is for home today vs MD note that indicated 2-3 days.   Pt NEEDS to have home o2 for home. SW waiting for PC back.. if Hospice can bring 02 here Fanny can transport   Will need to fax final d.c order once completed       Corinne C. White, LEO       Addendum;  [facility] not able to staff admission till tomorrow . They will have O2 delivered here for a d.c at 10:00 a,m for tomorrow  Comfort meds ordered by [facility] team   Will update Fanny and pt about plan.. Hospice RN will be at the home at 1300

## 2021-06-04 NOTE — PROGRESS NOTES
"[facility]    Hospitalist Progress Note      Assessment & Plan   Jesus Alberto Porras is a 75 year old male who was admitted on 5/30/2021.    Summary of Stay:   Jesus Alberto Porras is a 75 year old male who presents with shortness of breath and cough.     This is a 75-year-old who has a recent diagnosis of metastatic neuroendocrine malignancy, diagnosed on liver biopsy on 5/13/2021.  He follows up with Dr. Ursula Marcus from [facility].  But he has not been started on chemotherapy yet.     He presented to the emergency room with 2 to 3-day history of progressively worsening cough and shortness of breath.  According to the patient, it started with what he thought was his seasonal allergies in form of runny nose and \"tickle in the throat\".  But since yesterday, he has been coughing a lot.  He has also been experiencing shortness of breath.  No fever or chills.  No chest pain.  No abdominal symptoms.     He has not received COVID-19 vaccine.  His girlfriend who lives with him is vaccinated.  Denies coming in contact with anyone with COVID-19.  COVID-19 test was negative in the ER.     In the ER, noted to have significant leukocytosis.  At rest, his oxygen saturation was around 90% but with activity he was becoming hypoxic.  So he is requiring oxygen.  He is being admitted to internal medicine service for a possible community-acquired pneumonia.     CT chest was negative for PE. Stable to slight interval decreased size of the right lung base mass.  Patient met with hospice team yesterday and would like to go home on hospice does not want any aggressive measures.  He is being discharged home with hospice care.        Plan:    Sepsis secondary to Community-acquired pneumonia.  Acute hypoxic respiratory failure.  -On Admission, patient was started on ceftriaxone and azithromycin.  Despite getting the antibiotics, leukocytosis has worsened.  -Patient was started on IV vancomycin with worsening " leukocytosis.  Will be discharged home on oral antibiotics.  Patient is planning to be discharged on hospice care  -Today, white cell count is slightly better.  But he has worsening oxygen requirement.  Oxygen need up to 3 L nasal cannula.  No oxygen needed at home.  -Also appears to be tired and sleepy.  Poor oral intake.  -Changed ceftriaxone to cefepime.  Also started on vancomycin yesterday.  Monitor white cell count.  -Despite that the leukocytosis is worsening.  Although clinically patient does not feel worse.  -No other obvious source of infection.  No abdominal symptoms.  Denies any diarrhea/nausea/vomiting.  No new back pain or joint pain.  No sore throat.  No urinary symptoms.  -Right upper quadrant ultrasound shows metastatic disease.  No obvious abscess seen.     Elevated LFTs.  Elevated alk phos and AST.  -related to neuroendocrine tumor with metastasis to the liver  -Patient denies any new abdominal pain, nausea or vomiting.  -Right upper quadrant ultrasound shows metastatic disease.  No obvious abscess.     Hyponatremia  --started on NS on 6/2/21. monitor.  Could be related to underlying malignancy and pulmonary involvement.      Malignant neuroendocrine tumor.  -Thought to be metastatic in nature.  Unclear what the primary is, possibly pancreatic.  -Follow-up with [facility], Dr. Marcus.  -Seen by palliative care team.  Patient does not want to consider chemotherapy at this point.  Wants to consider hospice care.    Anxiety disorder  -On Klonopin 3 times a day.  Hold as appears rather sleepy today.    Palliative care consult appreciated.  Patient opted for hospice care will be discharged home on hospice care     DVT Prophylaxis: Enoxaparin (Lovenox) SQ  Code Status: No CPR- Do NOT Intubate  Expected discharge: 2-3 days    Corrine Martinez MD      Interval History   Patient was evaluated with nursing staff. Overnight issues discussed.  As you care reviewed chart.  Patient is very clear that  he would like to go home as soon as possible on home hospice care.   Care plan discussed with care coordinator.  And nursing staff.  Patient will be discharged home if oxygen is arranged for later today.  Total time spent more than 35 minutes for coordinating care  -Data reviewed today: Labs and medications.    Physical Exam   Temp: 97.6  F (36.4  C) Temp src: Oral BP: (!) 115/22 Pulse: 90   Resp: 24 SpO2: 97 % O2 Device: Nasal cannula Oxygen Delivery: 3 LPM  Vitals:    05/30/21 1530   Weight: 62.2 kg (137 lb 3.2 oz)     Vital Signs with Ranges  Temp:  [97.6  F (36.4  C)-98.1  F (36.7  C)] 97.6  F (36.4  C)  Pulse:  [75-91] 90  Resp:  [16-24] 24  BP: ()/(22-74) 115/22  SpO2:  [95 %-98 %] 97 %  I/O last 3 completed shifts:  In: 2628 [P.O.:390; I.V.:2238]  Out: 600 [Urine:600]    Constitutional: Sleepy but arousable.  HEENT: Trachea midline, sclera is clear   Respiratory: Minimal crackles, no wheezing, decreased air entry at the bases.  Cardiovascular: Regular rate and rhythm, normal S1 and S2, and no murmur noted  GI: Normal bowel sounds, soft, non-distended, non-tender  Extremities: No pitting edema     Medications     sodium chloride 75 mL/hr at 06/03/21 2319       ceFEPIme (MAXIPIME) IV  1 g Intravenous TID     cetirizine  10 mg Oral Daily     [Held by provider] clonazePAM  0.5 mg Oral TID w/meals     enoxaparin ANTICOAGULANT  40 mg Subcutaneous Daily     sodium chloride (PF)  3 mL Intracatheter Q8H     vancomycin (VANCOCIN) IV  1,000 mg Intravenous Q12H       Data   Recent Labs   Lab 06/04/21  0459 06/03/21  1417 06/03/21  0639 06/02/21  0455 05/30/21  1116 05/30/21  1116   WBC 20.3*  --  21.3* 25.3*   < > 17.0*   HGB 11.5*  --  10.8* 11.8*   < > 12.8*   MCV 96  --  95 90   < > 92     --  282 296   < > 411   INR  --   --   --  1.34*  --   --    * 129*  --  126*   < > 134   POTASSIUM 4.4 4.2  --  3.6   < > 4.3   CHLORIDE 95 92*  --  89*   < > 98   CO2 33* 35*  --  31   < > 34*   BUN 13 16  --   11   < > 9   CR 0.39* 0.49*  --  0.47*   < > 0.70   ANIONGAP 3 2*  --  6   < > 2*   TOMMY 8.5 8.4*  --  8.2*   < > 8.6   * 115*  --  133*   < > 142*   ALBUMIN 2.0*  --   --  2.4*   < > 2.5*   PROTTOTAL 6.9  --   --  7.4   < > 7.6   BILITOTAL 0.9  --   --  1.3   < > 0.7   ALKPHOS 419*  --   --  423*   < > 391*   ALT 80*  --   --  114*   < > 58   *  --   --  385*   < > 114*   TROPI  --   --   --   --   --  <0.015    < > = values in this interval not displayed.       Recent Results (from the past 24 hour(s))   US Abdomen Limited    Narrative    ULTRASOUND ABDOMEN LIMITED 6/3/2021 8:58 AM    CLINICAL HISTORY: Elevated LFTs, leukocytosis. Assess for  abscess/cholecystitis. History of   neuroendocrine tumor.    TECHNIQUE: Limited abdominal ultrasound.    COMPARISON: CT 5/1/2021    FINDINGS:    GALLBLADDER: Surgically absent.    BILE DUCTS: There is no biliary dilatation. The common duct measures  5mm.    LIVER: The liver is enlarged, measuring 20.0 cm in length. There is  marked hepatic heterogeneity with several probable focal lesions much  better demonstrated on prior CT and compatible with metastatic  disease.    RIGHT KIDNEY: No hydronephrosis.    PANCREAS: Obscured by intestinal gas.    No ascites.      Impression    IMPRESSION:  1.  The liver appears to be diffusely involved with metastatic disease  and is enlarged.    ABIGAIL WOOD MD

## 2021-06-04 NOTE — ACP (ADVANCE CARE PLANNING)
[facility] Progress Note  FR Med Surg 5    Sp[facility] consult order for assistance with healthcare directive.  Facilitated notary services and faxed completed document to [employer].      Wisam Ponce MA  Staff   Pager: [phone]  Phone: [phone]

## 2021-06-04 NOTE — PLAN OF CARE
End of Shift Summary  For vital signs and complete assessments, please see documentation flowsheets.     Pertinent assessments: Very lethargic all evening, sleeping on and off. VSS. Remains on 3L NC. Denies pain & nausea. SOB with ambulation, lungs diminished. Non-productive congested cough. Voiding small amounts. Regular diet, poor appetite. Up assist of 2 GB & walker.     Major Shift Events: Very lethargic    Treatment Plan: IVF, Maxipime, Vanco. Supportive cares. Plan for HCD to get notarized tomorrow at 9:30AM.

## 2021-06-05 NOTE — PLAN OF CARE
Pertinent assessments: VSS on 3L NC. DNR/DNI. Afebrile. Up with 1, gait belt, and walker. Pt is ambulating to the bathroom. Tolerating a regular diet with poor appetite. IV Maxipime, IV Vanco, and PO Omnicef ordered.     Major Shift Events: none    Treatment Plan: Discharge home on hospice with O2. Meds in lock box and cabinet in med room.

## 2021-06-05 NOTE — PLAN OF CARE
Pt discharged home on hospice with all questions answered and meds given. Transported via stretcher van.

## 2021-06-05 NOTE — PLAN OF CARE
[id number]: VSS 3L O2, pt sitting comfortably in recliner. Plan to discharge home with hospice today.

## 2021-06-05 NOTE — PROGRESS NOTES
Care Management Discharge Note    Discharge Date: 06/05/21       Discharge Disposition: Hospice, [facility]    Discharge Services: None    Discharge DME: Oxygen, Wheelchair, Tub Transfer Bench confirmed with [facility] the DME will be delivered before 2pm.    Discharge Transportation: family or friend will provide    Private pay costs discussed: transportation costs    PAS Confirmation Code:  NA  Patient/family educated on Medicare website which has current facility and service quality ratings: no    Education Provided on the Discharge Plan:  Yes   Persons Notified of Discharge Plans: Yes  Patient/Family in Agreement with the Plan: yes    Handoff Referral Completed: No    Additional Information:  Family will not be transporting today, they want wheelchair transport due to O2 needs, aware of cost and new time of discharge which is 2pm via wheelchair. Updated [facility] of transportation change.    MITCH Reyes   Inpatient Care Coordination   Supervisor  [facility]  [phone]          LEO Blanco

## 2021-06-05 NOTE — PLAN OF CARE
End of Shift Summary  For vital signs and complete assessments, please see documentation flowsheets.     End of Shift Summary  For vital signs and complete assessments, please see documentation flowsheets.     Pertinent assessments: Denies pain. Lethargic/somnolent. LS dim, congested cough, DAHL, remains on 3 lpm supplemental oxygen. None  Major Shift Events: None    Treatment Plan: IVF, Maxipime, Vanco, Ominicerff Supportive cares. Oncology, palliative, SW & SH following. Plan to discharge home on hospice today.

## 2021-06-09 PROBLEM — R41.82 ALTERED MENTAL STATUS, UNSPECIFIED ALTERED MENTAL STATUS TYPE: Status: ACTIVE | Noted: 2021-01-01

## 2021-06-09 NOTE — ED NOTES
[facility]  ED Nurse Handoff Report    Jesus Alberto Porras is a 75 year old male   ED Chief complaint: Generalized Weakness  . ED Diagnosis:   Final diagnoses:   Altered mental status, unspecified altered mental status type     Allergies:   Allergies   Allergen Reactions     Dilantin [Phenytoin] Rash     Amoxicillin Rash     Carbamazepine Rash       Code Status: Comfort Care  Activity level - Baseline/Home:  Total Care. Activity Level - Current:   Total Care. Lift room needed: No. Bariatric: No   Needed: No   Isolation: No. Infection: Not Applicable.     Vital Signs:   Vitals:    06/09/21 1343 06/09/21 1345 06/09/21 1400 06/09/21 1401   BP:  126/82 127/77    Pulse:  80 85    Resp:  (!) 39 (!) 34    Temp:       TempSrc:       SpO2: 95% 96% 95% 95%       Cardiac Rhythm:  ,      Pain level:    Patient confused: Yes. Patient Falls Risk: Yes.   Elimination Status: Unable to void   Patient Report - Initial Complaint:Generalized Weakness     The history is limited by the condition of the patient.      Jesus Alberto Porras is a 75 year old male with history of metastatic neuroendocrine malignancy, diagnosed on liver biopsy on 5/13/2021 who presents via EMS from hospice with generalized weakness. He was admitted to the hospital on 5/30/2021 for shortness of breath and a worsening cough, diagnosed with pneumonia. He was discharged to hospice on 6/4. Today his significant other noticed he was weaker and more lethargic. He states it is not hard for him to breathe and he is not complaining of any pain.      Review of Systems   Unable to perform ROS: Mental status change (ROS supplemented by patient's significant other)   Focused Assessment:   Tests Performed: labs, imaging. Abnormal Results:   Abnormal Labs Reviewed   BASIC METABOLIC PANEL - Abnormal; Notable for the following components:       Result Value    Chloride 92 (*)     Carbon Dioxide >45 (*)     Glucose 100 (*)     Creatinine 0.45 (*)     All other  "components within normal limits   CBC WITH PLATELETS DIFFERENTIAL - Abnormal; Notable for the following components:    WBC 17.3 (*)     RBC Count 4.18 (*)     Hemoglobin 12.3 (*)     MCHC 30.1 (*)     Absolute Neutrophil 14.4 (*)     Absolute Monocytes 1.5 (*)     All other components within normal limits   ISTAT  GASES LACTATE BETTINA POCT - Abnormal; Notable for the following components:    PCO2 Venous 85 (*)     PO2 Venous 95 (*)     Bicarbonate Venous 57 (*)     All other components within normal limits      Treatments provided: see mar  Family Comments: wife, primary caregiver. Went home due to \"not sleeping for 3 days\"  OBS brochure/video discussed/provided to patient:  N/A  ED Medications:   Medications   morphine (PF) injection 4 mg (4 mg Intravenous Given 6/9/21 2043)     Drips infusing:  No  For the majority of the shift, the patient's behavior Green. Interventions performed were NA  Sepsis treatment initiated: No     Patient tested for COVID 19 prior to admission: YES    ED Nurse Name/Phone Number: Ariella Sanches RN,   2:56 PM    RECEIVING UNIT ED HANDOFF REVIEW    Above ED Nurse Handoff Report was reviewed: Yes  Reviewed by: Pamela Holloway RN on June 9, 2021 at 3:19 PM         "

## 2021-06-09 NOTE — ED NOTES
DATE:  6/9/2021   TIME OF RECEIPT FROM LAB:  1230  LAB TEST:  CO2  LAB VALUE:  >45  RESULTS GIVEN WITH READ-BACK TO (PROVIDER):  burns  TIME LAB VALUE REPORTED TO PROVIDER:   1230
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
## 2021-06-09 NOTE — ED TRIAGE NOTES
Pt with cancer diagnosis, recently treated for pneumonia and placed on hospice. Wife called EMS due to increased weakness and lethargy. BS: 109 per EMS. On 2L oxygen at baseline. ABC's intact, alert.

## 2021-06-09 NOTE — PHARMACY-ADMISSION MEDICATION HISTORY
Patient was discharged from [facility] 4 days ago; see EPIC admission navigator for allergy information, prior to admission medications and immunization status.  Since patient was discharged, completed course of azithromycin and cefdinir. In addition, morphine has been removed from list.     Prior to Admission medications    Medication Sig Last Dose Taking? Auth Provider   acetaminophen (TYLENOL) 650 MG suppository Place 1 suppository (650 mg) rectally every 4 hours as needed for fever or mild pain Past Week at Unknown time Yes Lauren Adkins APRN CNP   azelastine (ASTELIN) 0.1 % nasal spray Spray 1 spray into both nostrils daily Past Week at Unknown time Yes Unknown, Entered By History   bisacodyl (DULCOLAX) 10 MG suppository Place 1 suppository (10 mg) rectally daily as needed for constipation Past Week at Unknown time Yes Lauren Adkins APRN CNP   brimonidine (ALPHAGAN) 0.2 % ophthalmic solution Place 1 drop into the right eye 2 times daily Past Week at Unknown time Yes Unknown, Entered By History   cetirizine (ZYRTEC) 10 MG tablet Take 10 mg by mouth daily Past Week at Unknown time Yes Reported, Patient   clonazePAM (KLONOPIN) 0.5 MG tablet Take 1 tablet (0.5 mg) by mouth 3 times daily (with meals) Past Week at Unknown time Yes Lauren Adkins APRN CNP   dorzolamide-timolol (COSOPT) 2-0.5 % ophthalmic solution Place 1 drop into the right eye 2 times daily Past Week at Unknown time Yes Unknown, Entered By History   haloperidol (HALDOL) 2 MG/ML (HIGH CONC) solution Take 1 mL (2 mg) by mouth every 6 hours as needed for agitation or other (nausea) Past Week at Unknown time Yes Lauren Adkins APRN CNP   ketorolac (ACULAR) 0.5 % ophthalmic solution Place 1 drop into the right eye 2 times daily Past Week at Unknown time Yes Unknown, Entered By History   montelukast (SINGULAIR) 10 MG tablet Take 10 mg by mouth At Bedtime Past Week at Unknown time Yes Unknown, Entered By History    prochlorperazine (COMPAZINE) 10 MG tablet Take 10 mg by mouth every 6 hours as needed for nausea or vomiting Past Week at Unknown time Yes Unknown, Entered By History

## 2021-06-09 NOTE — PLAN OF CARE
Pertinent assessments: Oriented to self. Breathing shallow, tachypneic, accessory muscles in use. 2L O2 for comfort. Restless. Morphine given.   Major Shift Events Admitted to the floor.   Treatment Plan: Meds for symptom management, palliative consult.

## 2021-06-10 NOTE — PLAN OF CARE
Pertinent assessments: Oriented to self. Breathing shallow, tachypneic, accessory muscles in use. 2L O2 for comfort. Restless. Morphine given.     Major Shift Events: Morphine given for pain management with mild relief. Ativan ordered and given for anxiety and restlessness.     Treatment Plan: Repositioning. Oral Cares. Meds for symptom management, palliative consult.

## 2021-06-10 NOTE — DISCHARGE SUMMARY
[facility]  Expiration summary  Name: Jesus Alberto Porras    MRN: [id number]  YOB: 1945    Age: 75 year old  Date of Discharge:  No discharge date for patient encounter.  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  Multiple metastatic lesions brain, lung, pancreatic, liver       Other Diagnosis:  Past Medical History:   Diagnosis Date     CSF leak from ear      Gastro-oesophageal reflux disease      Seizures (H)      Sexual dysfunction           Discharge Disposition:  Patient  during this hospitalization under comfort care    Allergies:  Allergies   Allergen Reactions     Dilantin [Phenytoin] Rash     Amoxicillin Rash     Carbamazepine Rash        Discharge Medications:   Current Discharge Medication List      CONTINUE these medications which have NOT CHANGED    Details   acetaminophen (TYLENOL) 650 MG suppository Place 1 suppository (650 mg) rectally every 4 hours as needed for fever or mild pain  Qty: 4 suppository, Refills: 0    Associated Diagnoses: Pneumonia due to infectious organism, unspecified laterality, unspecified part of lung; Neuroendocrine cancer (H); Hospice care patient      azelastine (ASTELIN) 0.1 % nasal spray Spray 1 spray into both nostrils daily      bisacodyl (DULCOLAX) 10 MG suppository Place 1 suppository (10 mg) rectally daily as needed for constipation  Qty: 1 suppository, Refills: 0    Associated Diagnoses: Pneumonia due to infectious organism, unspecified laterality, unspecified part of lung; Neuroendocrine cancer (H); Hospice care patient      brimonidine (ALPHAGAN) 0.2 % ophthalmic solution Place 1 drop into the right eye 2 times daily      cetirizine (ZYRTEC)  10 MG tablet Take 10 mg by mouth daily      clonazePAM (KLONOPIN) 0.5 MG tablet Take 1 tablet (0.5 mg) by mouth 3 times daily (with meals)  Qty: 12 tablet, Refills: 0    Associated Diagnoses: Pneumonia due to infectious organism, unspecified laterality, unspecified part of lung; Neuroendocrine cancer (H); Hospice care patient      dorzolamide-timolol (COSOPT) 2-0.5 % ophthalmic solution Place 1 drop into the right eye 2 times daily      haloperidol (HALDOL) 2 MG/ML (HIGH CONC) solution Take 1 mL (2 mg) by mouth every 6 hours as needed for agitation or other (nausea)  Qty: 15 mL, Refills: 0    Associated Diagnoses: Pneumonia due to infectious organism, unspecified laterality, unspecified part of lung; Neuroendocrine cancer (H); Hospice care patient      ketorolac (ACULAR) 0.5 % ophthalmic solution Place 1 drop into the right eye 2 times daily      montelukast (SINGULAIR) 10 MG tablet Take 10 mg by mouth At Bedtime      prochlorperazine (COMPAZINE) 10 MG tablet Take 10 mg by mouth every 6 hours as needed for nausea or vomiting              Condition on Discharge:  Discharge condition: Stable   Discharge vitals: Blood pressure 133/64, pulse 67, temperature 98.4  F (36.9  C), temperature source Oral, resp. rate 28, SpO2 (!) 86 %.   Code status on discharge: Comfort Care     History of Present Illness:  See detailed admission note for full details.        Significant Physical Exam Findings Day of Discharge:  Absence of spontaneous breathing, no pulse, no discernible heart sounds        Procedures other than Imaging:  None     Imaging:  Results for orders placed or performed during the hospital encounter of 06/09/21   XR Chest Port 1 View    Narrative    CHEST ONE VIEW June 9, 2021 1:16 PM     HISTORY: Dyspnea.    COMPARISON: June 1, 2021.      Impression    IMPRESSION: Stable elevated right hemidiaphragm. Minimal pleural fluid  bilaterally. Question some patchy areas of ground-glass infiltrate  bilaterally.    NEWTON  MD LAILA        Consultations:  No consultations were requested during this admission.     Recent Lab Results:  Recent Labs   Lab 06/09/21  1230 06/05/21  0657 06/04/21  0459   WBC 17.3*  --  20.3*   HGB 12.3*  --  11.5*   HCT 40.9  --  38.2*   MCV 98  --  96    336 304     No results for input(s): CULT in the last 168 hours.  Recent Labs   Lab 06/09/21  1206 06/05/21  0657 06/04/21  0459 06/03/21  1417     --  131* 129*   POTASSIUM 4.4  --  4.4 4.2   CHLORIDE 92*  --  95 92*   CO2 >45*  --  33* 35*   ANIONGAP Not Calculated  --  3 2*   *  --  107* 115*   BUN 15  --  13 16   CR 0.45* 0.44* 0.39* 0.49*   GFRESTIMATED >90 >90 >90 >90   GFRESTBLACK >90 >90 >90 >90   TOMMY 8.5  --  8.5 8.4*   PROTTOTAL  --   --  6.9  --    ALBUMIN  --   --  2.0*  --    BILITOTAL  --   --  0.9  --    ALKPHOS  --   --  419*  --    AST  --   --  129*  --    ALT  --   --  80*  --      Recent Labs   Lab 06/09/21  1206 06/04/21  0459 06/03/21  1417   * 107* 115*     Recent Labs   Lab 06/09/21  1208   LACT 1.8     No results for input(s): TROPONIN, TROPI, TROPR in the last 168 hours.    Invalid input(s): TROP, TROPONINIES  No results for input(s): COLOR, APPEARANCE, URINEGLC, URINEBILI, URINEKETONE, SG, UBLD, URINEPH, PROTEIN, UROBILINOGEN, NITRITE, LEUKEST, RBCU, WBCU in the last 168 hours.       Pending Results:    Unresulted Labs Ordered in the Past 30 Days of this Admission     No orders found for last 31 day(s).             Hospital Course:  75-year-old  gentleman who was recently diagnosed with high-grade metastatic neuroendocrine malignancy last May 2021.  He was hospitalized in the latter part of that month for possible community-acquired pneumonia and found with multiple metastatic lesions as listed above.  He was discharged going home for hospice care.  He was brought back to the emergency room last June 9, 2021 for increasing needs of care, generalized weakness and getting more sedated.  Admitted  under comfort care measures and instituted comfort care management  Patient was pronounced today Cassandra 10, 2021 at 10:30 AM  I was informed by nursing service that family is aware of patient's demise.     Total time spent in face to face contact with the patient and coordinating discharge was:  > 30 Minutes.

## 2021-06-10 NOTE — CONSULTS
Care Management Initial Consult    General Information  Assessment completed with: Care Team Member,  Sister Meagan [phone]  Type of CM/SW Visit: Initial Assessment    Primary Care Provider verified and updated as needed:     Readmission within the last 30 days: no previous admission in last 30 days         Advance Care Planning:            Communication Assessment  Patient's communication style: spoken language (English or Bilingual)             Cognitive  Cognitive/Neuro/Behavioral: orientation        Orientation: disoriented x 4             Living Environment:   People in home: significant other  Fanny  Current living Arrangements: house      Able to return to prior arrangements: no  Living Arrangement Comments: needs placement     Family/Social Support:  Care provided by: spouse/significant other  Provides care for: no one  Marital Status: Lives with Significant Other  Sibling(s), Significant Other       Fanny   Description of Support System: Supportive, Involved    Support Assessment: Lacks adequate physical care, Adequate social supports, Adequate family and caregiver support    Current Resources:   Patient receiving home care services: No     Community Resources: Hospice- was open to [facility]  They were unaware that pt had been admitted   Equipment currently used at home: wheelchair, manual, walker, rolling(home o2 )  Supplies currently used at home: Incontinence Supplies    Employment/Financial:  Employment Status: retired        Financial Concerns:             Lifestyle & Psychosocial Needs:        Socioeconomic History     Marital status:      Spouse name: Not on file     Number of children: Not on file     Years of education: Not on file     Highest education level: Not on file     Tobacco Use     Smoking status: Former Smoker     Packs/day: 0.50     Tobacco comment: quit 16 yrs ago   Substance and Sexual Activity     Alcohol use: No     Drug use: No       Functional Status:  Prior to  admission patient needed assistance:   Dependent ADLs:: Bathing, Dressing, Eating, Grooming, Incontinence, Transfers  Dependent IADLs:: Cleaning, Cooking, Laundry, Shopping, Meal Preparation, Medication Management, Transportation, Money Management  Assesssment of Functional Status: Not at baseline with ADL Functioning, Not at baseline with mobility, Not at  functional baseline    Mental Health Status:          Chemical Dependency Status:                Values/Beliefs:  Spiritual, Cultural Beliefs, Taoist Practices, Values that affect care:                 Additional Information:  Pt d.c home last week with his SO to their home with support of Hospice,. SO not able to speak with SW today. SW did speak with pt's sister [phone] to speak about plan for placement options and private pay support for placement. SW will need to keep Hospice updated on placement locations for support.     Spoke with Meagan about placement options.. She will speak with her family about covering the private pay cost for deposit.  Meagan did giver permission to send referral to [facility] for a LTC bed.       Corinne C. White, LSW

## 2021-06-10 NOTE — PROGRESS NOTES
Cross Cx:     Patient anxious and unable to sleep. Mild relief with IV morphine. Ativan oral or IV ordered. Palliative care consult this AM. Patient is comfort measures.

## 2021-06-10 NOTE — PLAN OF CARE
Pt was repositioned at 1000 and given .5 mg of ativan for agitated breathing and shortly began to have longer and longer periods of apnea. This RN contacted the pts sister Felecia and notified her that if anyone wanted to visit, they should come soon. Pt was declared  at 1030. Family declined to visit. All lines removed from pt, was cleaned and sent to [facility] with belongings.